# Patient Record
Sex: FEMALE | Race: WHITE | Employment: FULL TIME | URBAN - METROPOLITAN AREA
[De-identification: names, ages, dates, MRNs, and addresses within clinical notes are randomized per-mention and may not be internally consistent; named-entity substitution may affect disease eponyms.]

---

## 2017-01-03 ENCOUNTER — TELEPHONE (OUTPATIENT)
Dept: INTERNAL MEDICINE CLINIC | Age: 32
End: 2017-01-03

## 2017-01-03 DIAGNOSIS — R10.9 LT FLANK PAIN: Primary | ICD-10-CM

## 2017-01-03 NOTE — TELEPHONE ENCOUNTER
Pt called stating her pain is worse and she is now having pain around her belly button and her lower abdomen as well as her lower back. She called a urologist Rogelio Quiroga (she is visiting her grandmother there) and they scheduled her an appt for today at 12:00 she needs notes faxed to [phone] please generate referral    Valerie Pillai is out can you please do this Cecile.  Thanks

## 2017-01-26 ENCOUNTER — OFFICE VISIT (OUTPATIENT)
Dept: FAMILY MEDICINE CLINIC | Age: 32
End: 2017-01-26

## 2017-01-26 VITALS
SYSTOLIC BLOOD PRESSURE: 108 MMHG | WEIGHT: 115.2 LBS | HEART RATE: 86 BPM | DIASTOLIC BLOOD PRESSURE: 70 MMHG | OXYGEN SATURATION: 99 % | HEIGHT: 66 IN | RESPIRATION RATE: 18 BRPM | TEMPERATURE: 96.9 F | BODY MASS INDEX: 18.51 KG/M2

## 2017-01-26 DIAGNOSIS — I83.93 VARICOSE VEINS OF BOTH LOWER EXTREMITIES: ICD-10-CM

## 2017-01-26 DIAGNOSIS — B37.0 ORAL CANDIDIASIS: Primary | ICD-10-CM

## 2017-01-26 RX ORDER — NYSTATIN 100000 [USP'U]/ML
1 SUSPENSION ORAL 4 TIMES DAILY
Qty: 140 ML | Refills: 0 | Status: SHIPPED | OUTPATIENT
Start: 2017-01-26 | End: 2017-01-26 | Stop reason: SDUPTHER

## 2017-01-26 RX ORDER — NYSTATIN 100000 [USP'U]/ML
1 SUSPENSION ORAL 4 TIMES DAILY
Qty: 140 ML | Refills: 0 | Status: SHIPPED | OUTPATIENT
Start: 2017-01-26 | End: 2017-02-02

## 2017-01-26 NOTE — PATIENT INSTRUCTIONS
Candidiasis: Care Instructions  Your Care Instructions  Candidiasis (say \"jjb-qna-KU-uh-tiki\") is a yeast infection. Yeast normally lives in your body. But it can cause problems if your body's defenses don't work as they should. Some medicines can increase your chance of getting a yeast infection. These include antibiotics, steroids, and cancer drugs. And some diseases like AIDS and diabetes can make you more likely to get yeast infections. There are different types of yeast infections. Nilesh Pierceal is a yeast infection in the mouth. It usually occurs in people with weak immune systems. It causes white patches inside the mouth and throat. Yeast infections of the skin usually occur in skin folds where the skin stays moist. They cause red, oozing patches on your skin. Babies can get these infections under the diaper. People who often wear gloves can get them on their hands. Many women get vaginal yeast infections. They are most common when women take antibiotics. These infections can cause the vagina to itch and burn. They also cause white discharge that looks like cottage cheese. In rare cases, yeast infects the blood. This can cause serious disease. This kind of infection is treated with medicine given through a needle into a vein (IV). After you start treatment, a yeast infection usually goes away quickly. But if your immune system is weak, the infection may come back. Tell your doctor if you get yeast infections often. Follow-up care is a key part of your treatment and safety. Be sure to make and go to all appointments, and call your doctor if you are having problems. It's also a good idea to know your test results and keep a list of the medicines you take. How can you care for yourself at home? · Take your medicines exactly as prescribed. Call your doctor if you think you are having a problem with your medicine. · Use antibiotics only as directed by your doctor. · Eat yogurt with live cultures.  It has bacteria called lactobacillus. It may help prevent some types of yeast infections. · Keep your skin clean and dry. Put powder on moist places. · If you are using a cream or suppository to treat a vaginal yeast infection, don't use condoms or a diaphragm. Use a different type of birth control. · Eat a healthy diet and get regular exercise. This will help keep your immune system strong. When should you call for help? Call your doctor now or seek immediate medical care if:  · You have a fever. · You are pregnant and have signs of a vaginal or urinary tract infection such as:  ¨ Severe itching in your vagina. ¨ Pain during sex or when you urinate. ¨ Unusual discharge from your vagina. ¨ A frequent urge to urinate. ¨ Urine that is cloudy or smells bad. Watch closely for changes in your health, and be sure to contact your doctor if:  · You do not get better as expected. Where can you learn more? Go to http://janell-nick.info/. Enter F124 in the search box to learn more about \"Candidiasis: Care Instructions. \"  Current as of: February 5, 2016  Content Version: 11.1  © 2782-4122 [employer]. Care instructions adapted under license by [employer] (which disclaims liability or warranty for this information). If you have questions about a medical condition or this instruction, always ask your healthcare professional. [address] any warranty or liability for your use of this information.

## 2017-01-26 NOTE — PROGRESS NOTES
Denice Kaiser is a 36 y.o. female here today because she was on Doxy and since that, she has had discoloration on her tongue. It started off brown, then green, then yellow, and now white. No pain noted. Learning assessment previously completed. 1. Have you been to the ER, urgent care clinic or hospitalized since your last visit? ER [facility] about a month ago. 2. Have you seen or consulted any other health care providers outside of the [address] [place] [address] since your last visit (Include any pap smears or colon screening)? Urologist in [facility]     Do you have an Advanced Directive? no    Would you like information on Advanced Directives?  no

## 2017-01-26 NOTE — MR AVS SNAPSHOT
Visit Information Date & Time Provider Department Dept. Phone Encounter #  
 1/26/2017  3:45 PM Lisa Butcher, [address] [facility] [phone] [id number] Upcoming Health Maintenance Date Due  
 PAP AKA CERVICAL CYTOLOGY 1/19/2018 DTaP/Tdap/Td series (2 - Td) 7/1/2026 Allergies as of 1/26/2017  Review Complete On: 1/26/2017 By: SALAZAR Dawkins Severity Noted Reaction Type Reactions Bactrim [Sulfamethoprim Ds]  01/12/2015    Nausea and Vomiting Macrobid [Nitrofurantoin Monohyd/m-cryst]  01/12/2015    Itching Throat tingling Current Immunizations  Never Reviewed Name Date Tdap 7/1/2016 12:40 PM  
  
 Not reviewed this visit You Were Diagnosed With   
  
 Codes Comments Oral candidiasis    -  Primary ICD-10-CM: B37.0 ICD-9-CM: 112.0 Varicose veins of both lower extremities     ICD-10-CM: I83.93 
ICD-9-CM: 454.9 Vitals BP Pulse Temp Resp Height(growth percentile) Weight(growth percentile) 108/70 (BP 1 Location: Left arm, BP Patient Position: Sitting) 86 96.9 °F (36.1 °C) (Oral) 18 5' 6\" (1.676 m) 115 lb 3.2 oz (52.3 kg) LMP SpO2 BMI OB Status Smoking Status 01/23/2017 (Approximate) 99% 18.59 kg/m2 Having regular periods Never Smoker Vitals History BMI and BSA Data Body Mass Index Body Surface Area 18.59 kg/m 2 1.56 m 2 Preferred Pharmacy Pharmacy Name Phone [address], [address] [facility] [address] [place] [address] [phone] Your Updated Medication List  
  
   
This list is accurate as of: 1/26/17  4:31 PM.  Always use your most recent med list.  
  
  
  
  
 ibuprofen 600 mg tablet Commonly known as:  MOTRIN Take 1 Tab by mouth every six (6) hours as needed for Pain. nystatin 100,000 unit/mL suspension Commonly known as:  MYCOSTATIN Take 5 mL by mouth four (4) times daily for 7 days.  swish and spit Indications: ORAL CANDIDIASIS Prescriptions Printed Refills  
 nystatin (MYCOSTATIN) 100,000 unit/mL suspension 0 Sig: Take 5 mL by mouth four (4) times daily for 7 days. swish and spit  Indications: ORAL CANDIDIASIS Class: Print Route: Oral  
  
Patient Instructions Candidiasis: Care Instructions Your Care Instructions Candidiasis (say \"xme-ccn-TW-uh-tiki\") is a yeast infection. Yeast normally lives in your body. But it can cause problems if your body's defenses don't work as they should. Some medicines can increase your chance of getting a yeast infection. These include antibiotics, steroids, and cancer drugs. And some diseases like AIDS and diabetes can make you more likely to get yeast infections. There are different types of yeast infections. Cristina Roane is a yeast infection in the mouth. It usually occurs in people with weak immune systems. It causes white patches inside the mouth and throat. Yeast infections of the skin usually occur in skin folds where the skin stays moist. They cause red, oozing patches on your skin. Babies can get these infections under the diaper. People who often wear gloves can get them on their hands. Many women get vaginal yeast infections. They are most common when women take antibiotics. These infections can cause the vagina to itch and burn. They also cause white discharge that looks like cottage cheese. In rare cases, yeast infects the blood. This can cause serious disease. This kind of infection is treated with medicine given through a needle into a vein (IV). After you start treatment, a yeast infection usually goes away quickly. But if your immune system is weak, the infection may come back. Tell your doctor if you get yeast infections often. Follow-up care is a key part of your treatment and safety.  Be sure to make and go to all appointments, and call your doctor if you are having problems. It's also a good idea to know your test results and keep a list of the medicines you take. How can you care for yourself at home? · Take your medicines exactly as prescribed. Call your doctor if you think you are having a problem with your medicine. · Use antibiotics only as directed by your doctor. · Eat yogurt with live cultures. It has bacteria called lactobacillus. It may help prevent some types of yeast infections. · Keep your skin clean and dry. Put powder on moist places. · If you are using a cream or suppository to treat a vaginal yeast infection, don't use condoms or a diaphragm. Use a different type of birth control. · Eat a healthy diet and get regular exercise. This will help keep your immune system strong. When should you call for help? Call your doctor now or seek immediate medical care if: 
· You have a fever. · You are pregnant and have signs of a vaginal or urinary tract infection such as: ¨ Severe itching in your vagina. ¨ Pain during sex or when you urinate. ¨ Unusual discharge from your vagina. ¨ A frequent urge to urinate. ¨ Urine that is cloudy or smells bad. Watch closely for changes in your health, and be sure to contact your doctor if: 
· You do not get better as expected. Where can you learn more? Go to http://janell-nick.info/. Enter V529 in the search box to learn more about \"Candidiasis: Care Instructions. \" Current as of: February 5, 2016 Content Version: 11.1 © 2857-9606 [employer]. Care instructions adapted under license by [employer] (which disclaims liability or warranty for this information). If you have questions about a medical condition or this instruction, always ask your healthcare professional. [place] [address] any warranty or liability for your use of this information. Introducing [facility]! Dear Mandy Aguero: Thank you for requesting a [employer] account. Our records indicate that you already have an active [employer] account. You can access your account anytime at [email or web address]://[employer]. [employer] Did you know that you can access your hospital and ER discharge instructions at any time in [employer]? You can also review all of your test results from your hospital stay or ER visit. Additional Information If you have questions, please visit the Frequently Asked Questions section of the [employer] website at [email or web address]. [employer]/. Remember, [employer] is NOT to be used for urgent needs. For medical emergencies, dial 911. Now available from your iPhone and Android! Please provide this summary of care documentation to your next provider. Your primary care clinician is listed as NOT ON FILE. If you have any questions after today's visit, please call [phone].

## 2017-01-26 NOTE — PROGRESS NOTES
Progress Note  Today's Date:  2017   Patient:  Evita Garcia  Patient :  1985    Subjective:   Evita Garcia is a 36 y.o. female who presents with c/o with coat on her tongue. States that she was treated with Doxycycline over a week ago and since then she has had discoloration of her tongue from brown to green to yellow to now white. Has not used any medication but states that she has been brushing her tongue on a regular basis. Denies pain, swelling or bleeding. Also states that she has varicose veins to both lower extremities that have gotten more noticeable overtime. She is a . States that she stand up at work most of the time at work which also cause her legs to be achy and \"jumpy'' sometimes. States that massaging them helps. Denies any other symptoms. Current Outpatient Meds and Allergies     Current Outpatient Prescriptions on File Prior to Visit   Medication Sig Dispense Refill    ibuprofen (MOTRIN) 600 mg tablet Take 1 Tab by mouth every six (6) hours as needed for Pain. 20 Tab 0     No current facility-administered medications on file prior to visit. These medications have been reviewed and reconciled with the patient during today's visit. Allergies   Allergen Reactions    Bactrim [Sulfamethoprim Ds] Nausea and Vomiting    Macrobid [Nitrofurantoin Monohyd/M-Cryst] Itching     Throat tingling        ROS:     SEE HPI    Objective:     VS:    Visit Vitals    /70 (BP 1 Location: Left arm, BP Patient Position: Sitting)    Pulse 86    Temp 96.9 °F (36.1 °C) (Oral)    Resp 18    Ht 5' 6\" (1.676 m)    Wt 115 lb 3.2 oz (52.3 kg)    LMP 2017 (Approximate)    SpO2 99%    BMI 18.59 kg/m2       General:   well-nourished, well-groomed, pleasant, alert, in no acute distress.      Mouth: Large patch of white substance covering the tongue, nonpainful; good dentition, oropharynx without exudate, no petechiae or ulcers  Nose:  External nares WNL  Neck:  Neck supple with normal ROM for age, no thyromegaly,   Extremities:   Varicose veins, no edema, no tenderness with palpation of calves, warm and well-perfused  Neuro:   Alert, conversant, appropriate, following commands, no focal deficits. Assessment:       1. Oral candidiasis    2. Varicose veins of both lower extremities        Plan:       Orders Placed This Encounter    nystatin (MYCOSTATIN) 100,000 unit/mL suspension     Sig: Take 5 mL by mouth four (4) times daily for 7 days. swish and spit  Indications: ORAL CANDIDIASIS     Dispense:  140 mL     Refill:  0     Order for Nyastin given  She may benefit form Waist-high compression stoking  I have discussed the diagnosis with the patient and the intended plan as seen in the above orders. The patient has received an after-visit summary along with patient information handout. I have discussed medication side effects and warnings with the patient as well. Pt verbalized understanding. Follow-up Disposition:  Return if symptoms worsen or fail to improve.   SALAZAR Munguia  1/26/2017, 4:40 PM

## 2017-02-07 ENCOUNTER — OFFICE VISIT (OUTPATIENT)
Dept: FAMILY MEDICINE CLINIC | Age: 32
End: 2017-02-07

## 2017-02-07 VITALS
WEIGHT: 115.6 LBS | RESPIRATION RATE: 18 BRPM | OXYGEN SATURATION: 98 % | TEMPERATURE: 97.9 F | HEIGHT: 66 IN | BODY MASS INDEX: 18.58 KG/M2 | HEART RATE: 82 BPM | SYSTOLIC BLOOD PRESSURE: 118 MMHG | DIASTOLIC BLOOD PRESSURE: 87 MMHG

## 2017-02-07 DIAGNOSIS — B37.0 THRUSH: Primary | ICD-10-CM

## 2017-02-07 RX ORDER — NYSTATIN 100000 [USP'U]/ML
1 SUSPENSION ORAL 4 TIMES DAILY
Qty: 200 ML | Refills: 0 | Status: SHIPPED | OUTPATIENT
Start: 2017-02-07 | End: 2017-02-17

## 2017-02-07 RX ORDER — TERBINAFINE HYDROCHLORIDE 250 MG/1
250 TABLET ORAL DAILY
Qty: 14 TAB | Refills: 0 | Status: SHIPPED | OUTPATIENT
Start: 2017-02-07 | End: 2017-03-06 | Stop reason: ALTCHOICE

## 2017-02-07 NOTE — PATIENT INSTRUCTIONS
Please use the nystatin swish and spit, swish for as long as possible at least 1-2 minutes, 4 times a day for 10 days. If not improved then fill rx for the antifungal pill. Thrush in Children: Care Instructions  Your Care Instructions  Waynetta Kill is a yeast infection inside the mouth. It can look like milk, formula, or cottage cheese but is hard to remove. If you scrape the thrush away, the skin underneath may bleed. Your child might get thrush after using antibiotics. Often there is not a specific cause. It sometimes occurs at the same time as a diaper rash. Waynetta Kill in infants and young children isn't a serious problem. It usually goes away on its own. Some children may need antifungal medicine. Follow-up care is a key part of your child's treatment and safety. Be sure to make and go to all appointments, and call your doctor if your child is having problems. It's also a good idea to know your child's test results and keep a list of the medicines your child takes. How can you care for your child at home? · Clean bottle nipples and pacifiers regularly in boiling water. · If you are breastfeeding, use an antifungal medicine, such as nystatin (Mycostatin), on your nipples. Dry your nipples after breastfeeding. · If your child is eating solid foods, you can massage plain, unflavored yogurt around the inside of your child's mouth. Check the label to make sure that the yogurt contains live cultures. Yogurt may help healthy bacteria grow in the mouth. These bacteria can stop yeast growth. · Be safe with medicines. Have your child take medicines exactly as prescribed. Call your doctor if you think your child is having a problem with his or her medicine. When should you call for help? Watch closely for changes in your child's health, and be sure to contact your doctor if:  · Your child will not eat or drink. · You have trouble giving or applying the medicine to your child.   · Your child still has thrush after 7 days.  · Your child gets a new diaper rash. · Your child is not acting normally. · Your child has a fever. Where can you learn more? Go to http://janell-nick.info/. Enter V150 in the search box to learn more about \"Thrush in Children: Care Instructions. \"  Current as of: July 26, 2016  Content Version: 11.1  © 2681-8566 [employer]. Care instructions adapted under license by [employer] (which disclaims liability or warranty for this information). If you have questions about a medical condition or this instruction, always ask your healthcare professional. [place] [address] any warranty or liability for your use of this information.

## 2017-02-07 NOTE — PROGRESS NOTES
Thrush        HPI: Micky Hogue is a 36 y.o. female WHITE OR   Here in follow up of continued thrush on her tongue she was seen on 1/26/17 by SALAZAR Kothari. Started on Nystatin swish and spit 5mL QID x 7 days. She believes it has improved somewhat but has not resolved completely. Denies any fevers. Past Medical History   Diagnosis Date    IC (interstitial cystitis)        Current Outpatient Prescriptions   Medication Sig    nystatin (MYCOSTATIN) 100,000 unit/mL suspension Take 5 mL by mouth four (4) times daily for 10 days. swish and spit    terbinafine HCl (LAMISIL) 250 mg tablet Take 1 Tab by mouth daily.  ibuprofen (MOTRIN) 600 mg tablet Take 1 Tab by mouth every six (6) hours as needed for Pain. No current facility-administered medications for this visit. Allergies   Allergen Reactions    Bactrim [Sulfamethoprim Ds] Nausea and Vomiting    Macrobid [Nitrofurantoin Monohyd/M-Cryst] Itching     Throat tingling        Past Surgical History   Procedure Laterality Date    Hx bladder suspension         Family History   Problem Relation Age of Onset    Cancer Mother      cervical/ pre-cancerous    No Known Problems Father     Diabetes Maternal Grandmother     Alcohol abuse Maternal Grandfather     Dementia Maternal Grandfather     Heart Disease Paternal Grandmother     Heart Disease Paternal Grandfather        Social History     Social History    Marital status: SINGLE     Spouse name: N/A    Number of children: N/A    Years of education: N/A     Occupational History    Not on file.      Social History Main Topics    Smoking status: Never Smoker    Smokeless tobacco: Never Used    Alcohol use No    Drug use: No    Sexual activity: Yes     Partners: Male     Birth control/ protection: Condom     Other Topics Concern     Service No    Blood Transfusions No    Caffeine Concern No    Occupational Exposure No    Hobby Hazards No    Sleep Concern No    Stress Concern No    Weight Concern No    Special Diet No    Back Care No    Exercise No    Bike Helmet No    Seat Belt Yes    Self-Exams Yes     Social History Narrative       Review of Systems   Constitutional: Negative for chills and fever. Visit Vitals    /87 (BP 1 Location: Right arm, BP Patient Position: Sitting)    Pulse 82    Temp 97.9 °F (36.6 °C) (Oral)    Resp 18    Ht 5' 6\" (1.676 m)    Wt 115 lb 9.6 oz (52.4 kg)    LMP 01/23/2017 (Approximate)    SpO2 98%    BMI 18.66 kg/m2       Physical Exam   Constitutional: She is oriented to person, place, and time. She appears well-developed and well-nourished. No distress. HENT:   Head: Normocephalic and atraumatic. Right Ear: External ear normal.   Left Ear: External ear normal.   Neurological: She is alert and oriented to person, place, and time. Skin: Skin is warm and dry. She is not diaphoretic. Psychiatric: She has a normal mood and affect. Nursing note and vitals reviewed. Assesment:  1. Thrush  Use nystatin swish and spit x 10 days. If no improvement then use terbinafine PO x 2 weeks. - nystatin (MYCOSTATIN) 100,000 unit/mL suspension; Take 5 mL by mouth four (4) times daily for 10 days. swish and spit  Dispense: 200 mL; Refill: 0  - terbinafine HCl (LAMISIL) 250 mg tablet; Take 1 Tab by mouth daily. Dispense: 14 Tab; Refill: 0      I have discussed the diagnosis with the patient and the intended management  The patient has received an after-visit summary and questions were answered concerning future plans. I have discussed medication usage, side effects and warnings with the patient as well. I have reviewed the plan of care with the patient, accepted their input and they are in agreement with the treatment goals. Follow-up Disposition:  Return if symptoms worsen or fail to improve.       Jonetta Schilder, MD                .

## 2017-02-21 ENCOUNTER — OFFICE VISIT (OUTPATIENT)
Dept: INTERNAL MEDICINE CLINIC | Age: 32
End: 2017-02-21

## 2017-02-21 VITALS
OXYGEN SATURATION: 99 % | DIASTOLIC BLOOD PRESSURE: 65 MMHG | HEART RATE: 85 BPM | BODY MASS INDEX: 18.48 KG/M2 | WEIGHT: 115 LBS | TEMPERATURE: 97.8 F | RESPIRATION RATE: 18 BRPM | SYSTOLIC BLOOD PRESSURE: 101 MMHG | HEIGHT: 66 IN

## 2017-02-21 DIAGNOSIS — N64.4 BREAST PAIN, RIGHT: ICD-10-CM

## 2017-02-21 DIAGNOSIS — N63.10 BREAST MASS, RIGHT: Primary | ICD-10-CM

## 2017-02-21 NOTE — PROGRESS NOTES
Micky Hogue presents today at the clinic for. Chief Complaint   Patient presents with    Breast Mass     Right Side Pain to touch         Wt Readings from Last 3 Encounters:   02/21/17 115 lb (52.2 kg)   02/07/17 115 lb 9.6 oz (52.4 kg)   01/26/17 115 lb 3.2 oz (52.3 kg)     Temp Readings from Last 3 Encounters:   02/21/17 97.8 °F (36.6 °C) (Oral)   02/07/17 97.9 °F (36.6 °C) (Oral)   01/26/17 96.9 °F (36.1 °C) (Oral)     BP Readings from Last 3 Encounters:   02/21/17 101/65   02/07/17 118/87   01/26/17 108/70     Pulse Readings from Last 3 Encounters:   02/21/17 85   02/07/17 82   01/26/17 86       There are no preventive care reminders to display for this patient. Coordination of Care:    1. Have you been to the ER, urgent care clinic since your last visit? Hospitalized since your last visit? No    2. Have you seen or consulted any other health care providers outside of the [address] [place] [address] since your last visit? Include any pap smears or colon screening.  No

## 2017-02-21 NOTE — PATIENT INSTRUCTIONS
Breast Lumps: Care Instructions  Your Care Instructions  Breast lumps are common, especially in women between ages 27 and 48. Many womens breasts feel lumpy and tender before their menstrual period. Women also may have lumps when they are breastfeeding. Breast lumps may go away after menopause. All new breast lumps in women after menopause should be checked by a doctor. Although lumps may be normal for you, it is important to have your doctor check any lump or thickness that is not like the rest of your breast to make sure it is not cancer. A lump may be larger, harder, or different from the rest of your breast tissue. Follow-up care is a key part of your treatment and safety. Be sure to make and go to all appointments, and call your doctor if you are having problems. Its also a good idea to know your test results and keep a list of the medicines you take. How can you care for yourself at home? · Make an appointment to have a mammogram and other follow-up visits as recommended by your doctor. When should you call for help? Call your doctor now or seek immediate medical care if:  · You have new changes in a breast, such as:  ¨ A lump or thickening in your breast or armpit. ¨ A change in the breast's size or shape. ¨ Skin changes, such as dimples or puckers. ¨ Nipple discharge. ¨ A change in the color or feel of the skin of your breast or the darker area around the nipple (areola). ¨ A change in the shape of the nipple (it may look like it's being pulled into the breast). · You have symptoms of a breast infection, such as:  ¨ Increased pain, swelling, redness, or warmth around a breast.  ¨ Red streaks extending from the breast.  ¨ Pus draining from a breast.  ¨ A fever. Watch closely for changes in your health, and be sure to contact your doctor if:  · You do not get better as expected. Where can you learn more? Go to http://janell-nick.info/.   Enter W663 in the search box to learn more about \"Breast Lumps: Care Instructions. \"  Current as of: February 25, 2016  Content Version: 11.1  © 8210-0635 [employer]. Care instructions adapted under license by [employer] (which disclaims liability or warranty for this information). If you have questions about a medical condition or this instruction, always ask your healthcare professional. [address] any warranty or liability for your use of this information.

## 2017-02-21 NOTE — PROGRESS NOTES
HISTORY OF PRESENT ILLNESS  Zara Carranza is a 36 y.o. female. HPI   Pt is here for a tender mass on her right breast/chest that she just noticed a few weeks ago. Denies nipple discharge, redness, skin changes, weight changes, and famhx of breast cancer. Allergies   Allergen Reactions    Bactrim [Sulfamethoprim Ds] Nausea and Vomiting    Macrobid [Nitrofurantoin Monohyd/M-Cryst] Itching     Throat tingling        Current Outpatient Prescriptions   Medication Sig    terbinafine HCl (LAMISIL) 250 mg tablet Take 1 Tab by mouth daily.  ibuprofen (MOTRIN) 600 mg tablet Take 1 Tab by mouth every six (6) hours as needed for Pain. No current facility-administered medications for this visit. Review of Systems   Constitutional: Negative. Negative for chills, fever and malaise/fatigue. HENT: Negative. Negative for congestion, ear pain, sore throat and tinnitus. Eyes: Negative. Negative for blurred vision, double vision and photophobia. Respiratory: Negative. Negative for cough, shortness of breath and wheezing. Cardiovascular: Positive for chest pain (right breast mass/tender). Negative for palpitations and leg swelling. Gastrointestinal: Negative. Negative for abdominal pain, heartburn, nausea and vomiting. Genitourinary: Negative. Negative for dysuria, frequency, hematuria and urgency. Musculoskeletal: Negative. Negative for back pain, joint pain, myalgias and neck pain. Skin: Negative. Negative for itching and rash. Neurological: Negative. Negative for dizziness, tingling, tremors and headaches. Psychiatric/Behavioral: Negative. Negative for depression and memory loss. The patient is not nervous/anxious and does not have insomnia.       Visit Vitals    /65 (BP 1 Location: Left arm, BP Patient Position: Sitting)    Pulse 85    Temp 97.8 °F (36.6 °C) (Oral)    Resp 18    Ht 5' 6\" (1.676 m)    Wt 115 lb (52.2 kg)    SpO2 99%    BMI 18.56 kg/m2       Physical Exam   Constitutional: She is oriented to person, place, and time. She appears well-developed and well-nourished. No distress. Pulmonary/Chest: Right breast exhibits mass and tenderness. Right breast exhibits no inverted nipple, no nipple discharge and no skin change. Left breast exhibits no inverted nipple, no mass, no nipple discharge, no skin change and no tenderness. Breasts are asymmetrical.       Genitourinary: There is breast tenderness (right breast). Neurological: She is alert and oriented to person, place, and time. Skin: Skin is warm and dry. She is not diaphoretic. Psychiatric: She has a normal mood and affect. Her behavior is normal.       ASSESSMENT and PLAN    ICD-10-CM ICD-9-CM    1. Breast mass, right N63 611.72 US BREAST RT LIMITED=<3 QUAD   2. Breast pain, right N64.4 611.71 US BREAST RT LIMITED=<3 QUAD     Follow-up Disposition:  Return if symptoms worsen or fail to improve. Pt expressed understanding of visit summary and plans for any follow ups or referrals as well as any medications prescribed.

## 2017-02-23 ENCOUNTER — TELEPHONE (OUTPATIENT)
Dept: INTERNAL MEDICINE CLINIC | Age: 32
End: 2017-02-23

## 2017-02-23 NOTE — TELEPHONE ENCOUNTER
JORGE Landaverde I spoke to the patient and she said she had her Breast Ultrasound today and they told her \"everything looks ok. \" She was very relieved and I told her you should have the results for review tomorrow.

## 2017-03-06 ENCOUNTER — HOSPITAL ENCOUNTER (OUTPATIENT)
Dept: LAB | Age: 32
Discharge: HOME | End: 2017-03-06
Payer: COMMERCIAL

## 2017-03-06 ENCOUNTER — OFFICE VISIT (OUTPATIENT)
Dept: FAMILY MEDICINE CLINIC | Age: 32
End: 2017-03-06

## 2017-03-06 VITALS
SYSTOLIC BLOOD PRESSURE: 114 MMHG | RESPIRATION RATE: 18 BRPM | HEART RATE: 90 BPM | OXYGEN SATURATION: 100 % | WEIGHT: 119.6 LBS | BODY MASS INDEX: 19.22 KG/M2 | TEMPERATURE: 96.9 F | DIASTOLIC BLOOD PRESSURE: 73 MMHG | HEIGHT: 66 IN

## 2017-03-06 DIAGNOSIS — B37.0 THRUSH: Primary | ICD-10-CM

## 2017-03-06 DIAGNOSIS — B37.0 THRUSH: ICD-10-CM

## 2017-03-06 PROCEDURE — 87070 CULTURE OTHR SPECIMN AEROBIC: CPT | Performed by: FAMILY MEDICINE

## 2017-03-06 RX ORDER — FLUCONAZOLE 200 MG/1
200 TABLET ORAL DAILY
Qty: 14 TAB | Refills: 0 | Status: SHIPPED | OUTPATIENT
Start: 2017-03-06 | End: 2017-03-20

## 2017-03-06 NOTE — PATIENT INSTRUCTIONS
Throat Culture: About This Test  What is it? A throat culture is a test to find a bacterial or fungal infection in the throat. Why is this test done? A throat culture may be done to:  · Find the cause of a sore throat. Most sore throat infections are caused by a virus. A throat culture shows the difference between a bacterial infection and a viral infection. · Check a person who may not have any symptoms of infection but who carries bacteria that can spread to others. This person is called a carrier. How can you prepare for the test?  You don't need to do anything before you have this test. Tell your doctor if you have recently taken any antibiotics. What happens during the test?  · You will be asked to tilt your head back and open your mouth as wide as possible. · Your doctor will press your tongue down with a flat stick (tongue depressor) and then examine your mouth and throat. · A clean cotton swab will be rubbed over the back of your throat, around your tonsils, and over any red areas or sores to collect a sample. How long does the test take? · The test will take less than a minute. · Throat culture test results for bacterial infections are ready in 1 to 2 days, depending on which bacteria are being tested for. Test results for a fungus may take about 7 days. When should you call for help? Call your doctor now or seek immediate medical care if:  · You have a new or higher fever  · You have a fever with a stiff neck or severe headache. · You have new or worse trouble swallowing. · Your sore throat gets much worse on one side. Watch closely for changes in your health, and be sure to contact your doctor if:  · You do not start to feel better after 2 days (48 hours). · You do not get better as expected. Follow-up care is a key part of your treatment and safety. Be sure to make and go to all appointments, and call your doctor if you are having problems.  It's also a good idea to keep a list of the medicines you take. Ask your doctor when you can expect to have your test results. Where can you learn more? Go to http://janell-nick.info/. Enter V393 in the search box to learn more about \"Throat Culture: About This Test.\"  Current as of: February 19, 2016  Content Version: 11.1  © 2006-2016 [employer]. Care instructions adapted under license by [employer] (which disclaims liability or warranty for this information). If you have questions about a medical condition or this instruction, always ask your healthcare professional. [address] any warranty or liability for your use of this information.

## 2017-03-06 NOTE — PROGRESS NOTES
John Dumas is a 36 y.o. female here today for c/o thrush. She states it has not gone away and she is out of medication. She has done away with sugar all week and using garlic for 3 days. She has also been taking pro-biotics. Learning assessment previously completed. 1. Have you been to the ER, urgent care clinic or hospitalized since your last visit? no     2. Have you seen or consulted any other health care providers outside of the [address] [place] [address] since your last visit (Include any pap smears or colon screening)? Went to gastro Dr. Jerry Staton     Do you have an Advanced Directive? no    Would you like information on Advanced Directives?  yes

## 2017-03-07 PROBLEM — Z71.89 ACP (ADVANCE CARE PLANNING): Status: ACTIVE | Noted: 2017-03-06

## 2017-03-07 NOTE — PROGRESS NOTES
Thrush        HPI: Caitlyn Colbert is a 36 y.o. female WHITE [place]   Here in follow up of her thrush of the tongue. She has completed her course of terbinafine but it has not improved. She denies any fevers. Past Medical History:   Diagnosis Date    IC (interstitial cystitis)        Current Outpatient Prescriptions   Medication Sig    fluconazole (DIFLUCAN) 200 mg tablet Take 1 Tab by mouth daily for 14 days. FDA advises cautious prescribing of oral fluconazole in pregnancy.  ibuprofen (MOTRIN) 600 mg tablet Take 1 Tab by mouth every six (6) hours as needed for Pain. No current facility-administered medications for this visit. Allergies   Allergen Reactions    Bactrim [Sulfamethoprim Ds] Nausea and Vomiting    Macrobid [Nitrofurantoin Monohyd/M-Cryst] Itching     Throat tingling        Past Surgical History:   Procedure Laterality Date    HX BLADDER SUSPENSION         Family History   Problem Relation Age of Onset    Cancer Mother      cervical/ pre-cancerous    No Known Problems Father     Diabetes Maternal Grandmother     Alcohol abuse Maternal Grandfather     Dementia Maternal Grandfather     Heart Disease Paternal Grandmother     Heart Disease Paternal Grandfather        Social History     Social History    Marital status: SINGLE     Spouse name: N/A    Number of children: N/A    Years of education: N/A     Occupational History    Not on file.      Social History Main Topics    Smoking status: Never Smoker    Smokeless tobacco: Never Used    Alcohol use No    Drug use: No    Sexual activity: Yes     Partners: Male     Birth control/ protection: Condom     Other Topics Concern     Service No    Blood Transfusions No    Caffeine Concern No    Occupational Exposure No    Hobby Hazards No    Sleep Concern No    Stress Concern No    Weight Concern No    Special Diet No    Back Care No    Exercise No    Bike Helmet No    Seat Belt Yes    Self-Exams Yes Social History Narrative     Review of Systems   Constitutional: Negative for chills, fever and weight loss. Visit Vitals    /73 (BP 1 Location: Left arm, BP Patient Position: Sitting)    Pulse 90    Temp 96.9 °F (36.1 °C) (Oral)    Resp 18    Ht 5' 6\" (1.676 m)    Wt 119 lb 9.6 oz (54.3 kg)    LMP 02/17/2017    SpO2 100%    BMI 19.3 kg/m2       Physical Exam   Constitutional: She is oriented to person, place, and time. She appears well-developed and well-nourished. No distress. HENT:   Head: Normocephalic and atraumatic. Right Ear: External ear normal.   Left Ear: External ear normal.   White spots on posterior aspect of tongue   Lymphadenopathy:     She has no cervical adenopathy. Neurological: She is alert and oriented to person, place, and time. Skin: She is not diaphoretic. Nursing note and vitals reviewed. Assesment:  1. Thrush    - THROAT CULTURE; Future  - fluconazole (DIFLUCAN) 200 mg tablet; Take 1 Tab by mouth daily for 14 days. FDA advises cautious prescribing of oral fluconazole in pregnancy. Dispense: 14 Tab; Refill: 0      I have discussed the diagnosis with the patient and the intended management  The patient has received an after-visit summary and questions were answered concerning future plans. I have discussed medication usage, side effects and warnings with the patient as well. I have reviewed the plan of care with the patient, accepted their input and they are in agreement with the treatment goals. Follow-up Disposition:  Return if symptoms worsen or fail to improve.       La Junior MD                .

## 2017-03-08 LAB
BACTERIA SPEC CULT: NORMAL
BACTERIA SPEC CULT: NORMAL
SERVICE CMNT-IMP: NORMAL

## 2017-04-14 ENCOUNTER — OFFICE VISIT (OUTPATIENT)
Dept: FAMILY MEDICINE CLINIC | Age: 32
End: 2017-04-14

## 2017-04-14 VITALS
TEMPERATURE: 97.1 F | RESPIRATION RATE: 18 BRPM | SYSTOLIC BLOOD PRESSURE: 133 MMHG | BODY MASS INDEX: 19.29 KG/M2 | DIASTOLIC BLOOD PRESSURE: 82 MMHG | HEART RATE: 94 BPM | WEIGHT: 120 LBS | HEIGHT: 66 IN | OXYGEN SATURATION: 100 %

## 2017-04-14 DIAGNOSIS — K14.8 LESION OF TONGUE: Primary | ICD-10-CM

## 2017-04-14 NOTE — PATIENT INSTRUCTIONS
Learning About Dental Care  What is basic dental care? Basic dental care involves brushing and flossing your teeth regularly to remove plaque. Plaque is a thin film of bacteria that sticks to teeth above and below the gum line. It can build up and harden into tartar, which makes it harder to give the teeth a good cleaning. Tartar usually has to be removed by a dental hygienist.  The bacteria in plaque use sugars to make acids. These acids can damage the gums and teeth. Be sure to see your dentist and dental hygienist for regular checkups and cleanings. How can dental care affect your health? Practicing basic dental care:  · Removes plaque that can cause gum disease and tooth decay. Tooth decay can lead to a hole in the tooth (cavity). · Helps prevent bad breath. Brushing and flossing rid your mouth of the bacteria that cause bad breath. · Helps keep teeth white by preventing staining from food, drinks, and tobacco.  · Makes it possible for your teeth to last a lifetime. What can you do to prevent dental problems? · Brush your teeth twice a day, in the morning and at night. ¨ Use a toothbrush with soft, rounded-end bristles and a head that is small enough to reach all parts of your teeth and mouth. ¨ Replace your toothbrush every 3 to 4 months. ¨ Use a fluoride toothpaste. ¨ Place the brush at a 45-degree angle where the teeth meet the gums. Press firmly, and gently rock the brush back and forth using small circular movements. ¨ Brush chewing surfaces vigorously with short back-and-forth strokes. ¨ Brush your tongue from back to front. · Floss at least once a day. Choose the type and flavor you like best.  · Schedule checkups and cleanings as often as your dentist recommends it. · Eat a healthy diet to help keep your gums healthy and your teeth strong. Choose foods that are good for your teeth, such as whole grains, vegetables, fruits, and foods that are low in saturated fat and sodium. Mozzarella and other cheeses, peanuts, yogurt, and milk are good for your teeth. Sugar-free chewing gum (especially gum that contains xylitol) is also a good choice. · Avoid foods that contain a lot of sugar, especially sticky, sweet foods like taffy. · Don't snack before bedtime. Food left on the teeth is more likely to cause tooth decay overnight. · Don't smoke or use smokeless tobacco. Tobacco can make tooth decay worse. If you need help quitting, talk to your doctor about stop-smoking programs and medicines. These can increase your chances of quitting for good. Where can you learn more? Go to http://janell-nick.info/. Enter J358 in the search box to learn more about \"Learning About Dental Care. \"  Current as of: August 9, 2016  Content Version: 11.2  © 8781-3699 [employer]. Care instructions adapted under license by [employer] (which disclaims liability or warranty for this information). If you have questions about a medical condition or this instruction, always ask your healthcare professional. [address] any warranty or liability for your use of this information.

## 2017-04-14 NOTE — PROGRESS NOTES
Ace Conde is a 36 y.o. female here today for a follow up on her thrush. Learning assessment previously completed. 1. Have you been to the ER, urgent care clinic or hospitalized since your last visit? no    2. Have you seen or consulted any other health care providers outside of the [address] [place] [address] since your last visit (Include any pap smears or colon screening)? Urologist  At [facility]    Do you have an Advanced Directive? No    Would you like information on Advanced Directives?  no

## 2017-04-14 NOTE — PROGRESS NOTES
HISTORY OF PRESENT ILLNESS  Renaldo Chamorro is a 36 y.o. female. HPI  Renaldo Chamorro is a 36 y.o. female who presents to the office today for thrush. She is new to me. She has been evaluated and treated for thrush by both Lisa and Dr. Rae Pugh. This started soon after she was given an oral antibiotic for GYN issues in January. First she was given two courses of nystatin wash. Then treated with a course of terbinafine and a 14 day course of diflucan with no improvement. Tongue culture was negative for organisms other than normal lenard. She is here today because she does not think there has been any change in the white patches on her tongue. Denies tobacco or alcohol use. She has regular dental cleanings every 6 months. She saw her dentist a few months ago during her treatment for possible thrush, and they told her there were no concerns, and her exam looked normal.       Chief Complaint   Patient presents with   Sabra Acuna       Current Outpatient Prescriptions on File Prior to Visit   Medication Sig Dispense Refill    ibuprofen (MOTRIN) 600 mg tablet Take 1 Tab by mouth every six (6) hours as needed for Pain. 20 Tab 0     No current facility-administered medications on file prior to visit.       Allergies   Allergen Reactions    Bactrim [Sulfamethoprim Ds] Nausea and Vomiting    Macrobid [Nitrofurantoin Monohyd/M-Cryst] Itching     Throat tingling      Past Medical History:   Diagnosis Date    IC (interstitial cystitis)      History   Smoking Status    Never Smoker   Smokeless Tobacco    Never Used     History   Alcohol Use No     Family History   Problem Relation Age of Onset    Cancer Mother      cervical/ pre-cancerous    No Known Problems Father     Diabetes Maternal Grandmother     Alcohol abuse Maternal Grandfather     Dementia Maternal Grandfather     Heart Disease Paternal Grandmother     Heart Disease Paternal Grandfather      Review of Systems   Constitutional: Negative for fever and malaise/fatigue. HENT: Negative for congestion and sore throat. Respiratory: Negative for cough and sputum production. Skin: Negative for itching. Endo/Heme/Allergies: Negative for environmental allergies. Does not bruise/bleed easily. Visit Vitals    /82 (BP 1 Location: Left arm, BP Patient Position: Sitting)    Pulse 94    Temp 97.1 °F (36.2 °C) (Oral)    Resp 18    Ht 5' 6\" (1.676 m)    Wt 120 lb (54.4 kg)    LMP 04/11/2017 (Exact Date)    SpO2 100%    BMI 19.37 kg/m2     Physical Exam   Constitutional: She is oriented to person, place, and time. She appears well-developed and well-nourished. No distress. HENT:   Mouth/Throat: Uvula is midline, oropharynx is clear and moist and mucous membranes are normal. No oral lesions. Normal dentition. No lacerations. Neck: Neck supple. Cardiovascular: Normal rate. Lymphadenopathy:        Head (right side): No submental, no submandibular, no tonsillar, no preauricular and no posterior auricular adenopathy present. Head (left side): No submental, no submandibular, no tonsillar, no preauricular and no posterior auricular adenopathy present. She has no cervical adenopathy. Right: No supraclavicular adenopathy present. Left: No supraclavicular adenopathy present. Neurological: She is alert and oriented to person, place, and time. Psychiatric: She has a normal mood and affect. Nursing note and vitals reviewed. ASSESSMENT and PLAN    ICD-10-CM ICD-9-CM    1. Lesion of tongue K14.8 529.8       Does not appear to have thrush on exam. I do not see any tongue lesions at all and no white patches or film. We discussed that this will be monitored, and if she notices a change, then return to the clinic and discuss a tongue biopsy. She has regular dental cleanings scheduled every 6 months and there was no concern at her last cleaning a few months ago. Patient agrees with the plan and verbalizes understanding. Follow-up Disposition:  Return if symptoms worsen or fail to improve.     Raina Leslie PA-C  4/14/2017

## 2017-05-01 ENCOUNTER — OFFICE VISIT (OUTPATIENT)
Dept: INTERNAL MEDICINE CLINIC | Age: 32
End: 2017-05-01

## 2017-05-01 VITALS
HEART RATE: 100 BPM | DIASTOLIC BLOOD PRESSURE: 69 MMHG | WEIGHT: 119 LBS | SYSTOLIC BLOOD PRESSURE: 122 MMHG | RESPIRATION RATE: 18 BRPM | HEIGHT: 66 IN | TEMPERATURE: 98.5 F | BODY MASS INDEX: 19.13 KG/M2 | OXYGEN SATURATION: 100 %

## 2017-05-01 DIAGNOSIS — R10.84 GENERALIZED ABDOMINAL PAIN: Primary | ICD-10-CM

## 2017-05-01 LAB
BILIRUB UR QL STRIP: NEGATIVE
GLUCOSE UR-MCNC: NEGATIVE MG/DL
HCG URINE, QL. (POC): NEGATIVE
KETONES P FAST UR STRIP-MCNC: NEGATIVE MG/DL
PH UR STRIP: 6 [PH] (ref 4.6–8)
PROT UR QL STRIP: NEGATIVE MG/DL
SP GR UR STRIP: 1.03 (ref 1–1.03)
UA UROBILINOGEN AMB POC: NORMAL (ref 0.2–1)
URINALYSIS CLARITY POC: CLEAR
URINALYSIS COLOR POC: YELLOW
URINE BLOOD POC: NEGATIVE
URINE LEUKOCYTES POC: NEGATIVE
URINE NITRITES POC: NEGATIVE
VALID INTERNAL CONTROL?: YES

## 2017-05-01 NOTE — PROGRESS NOTES
HISTORY OF PRESENT ILLNESS  Raven Sheikh is a 29 y.o. female. HPI Ms. Edwin Banks is here for c/o sensation that her stomach is  \"burning\". She did see gastroenterology - Dr. Ken Jesus within the past few months. She had an endoscopy with no abnormal findings. She states he wanted her to do a breath test (lactulose breath test), but she had not done that. She did tell me at the end of the visit that she has the breath test scheduled for tomorrow. She was given an antispasmodic and advised to take metamucil. She has not taken the antispasmodic (Levsin). GI notes are scanned. Review of Systems   Constitutional: Negative. HENT: Negative. Respiratory: Negative. Cardiovascular: Negative. Gastrointestinal: Positive for abdominal pain (generalized burning abdominal pain) and nausea. Neurological: Positive for dizziness. Physical Exam   Constitutional: She is oriented to person, place, and time. She appears well-developed and well-nourished. No distress. HENT:   Head: Normocephalic and atraumatic. Cardiovascular: Normal rate. Pulmonary/Chest: Effort normal.   Abdominal: Soft. She exhibits no distension and no mass. There is no tenderness. Neurological: She is alert and oriented to person, place, and time.      Visit Vitals    /69 (BP 1 Location: Left arm, BP Patient Position: Sitting)    Pulse 100    Temp 98.5 °F (36.9 °C)    Resp 18    Ht 5' 6\" (1.676 m)    Wt 119 lb (54 kg)    SpO2 100%    BMI 19.21 kg/m2     Results for orders placed or performed in visit on 05/01/17   AMB POC URINALYSIS DIP STICK AUTO W/O MICRO   Result Value Ref Range    Color (UA POC) Yellow     Clarity (UA POC) Clear     Glucose (UA POC) Negative Negative    Bilirubin (UA POC) Negative Negative    Ketones (UA POC) Negative Negative    Specific gravity (UA POC) 1.030 1.001 - 1.035    Blood (UA POC) Negative Negative    pH (UA POC) 6.0 4.6 - 8.0    Protein (UA POC) Negative Negative mg/dL    Urobilinogen (UA POC) 0.2 mg/dL 0.2 - 1    Nitrites (UA POC) Negative Negative    Leukocyte esterase (UA POC) Negative Negative   AMB POC URINE PREGNANCY TEST, VISUAL COLOR COMPARISON   Result Value Ref Range    VALID INTERNAL CONTROL POC Yes     HCG urine, Ql. (POC) Negative Negative       ASSESSMENT and PLAN    ICD-10-CM ICD-9-CM    1. Generalized abdominal pain R10.84 789.07 AMB POC URINALYSIS DIP STICK AUTO W/O MICRO      AMB POC URINE PREGNANCY TEST, VISUAL COLOR COMPARISON      CBC WITH AUTOMATED DIFF      METABOLIC PANEL, COMPREHENSIVE      H. PYLORI BREATH TEST     Pt verbalized understanding of their condition and diagnoses, treatment plan,  as well as side effects of any new medications prescribed. Total time spent with pt 25min, greater than 50% which was spent counseling.

## 2017-05-01 NOTE — PROGRESS NOTES
Chief Complaint   Patient presents with    Abdominal Pain    Nausea     1. Have you been to the ER, urgent care clinic since your last visit? Hospitalized since your last visit? No    2. Have you seen or consulted any other health care providers outside of the [address] [place] [address] since your last visit? Include any pap smears or colon screening.  No

## 2017-05-02 LAB
ALBUMIN SERPL-MCNC: 4.2 G/DL (ref 3.5–5.5)
ALBUMIN/GLOB SERPL: 1.6 {RATIO} (ref 1.2–2.2)
ALP SERPL-CCNC: 46 IU/L (ref 39–117)
ALT SERPL-CCNC: 12 IU/L (ref 0–32)
AST SERPL-CCNC: 14 IU/L (ref 0–40)
BASOPHILS # BLD AUTO: 0 X10E3/UL (ref 0–0.2)
BASOPHILS NFR BLD AUTO: 0 %
BILIRUB SERPL-MCNC: 0.3 MG/DL (ref 0–1.2)
BUN SERPL-MCNC: 17 MG/DL (ref 6–20)
BUN/CREAT SERPL: 23 (ref 9–23)
CALCIUM SERPL-MCNC: 9 MG/DL (ref 8.7–10.2)
CHLORIDE SERPL-SCNC: 102 MMOL/L (ref 96–106)
CO2 SERPL-SCNC: 22 MMOL/L (ref 18–29)
CREAT SERPL-MCNC: 0.73 MG/DL (ref 0.57–1)
EOSINOPHIL # BLD AUTO: 0.1 X10E3/UL (ref 0–0.4)
EOSINOPHIL NFR BLD AUTO: 1 %
ERYTHROCYTE [DISTWIDTH] IN BLOOD BY AUTOMATED COUNT: 13.7 % (ref 12.3–15.4)
GLOBULIN SER CALC-MCNC: 2.7 G/DL (ref 1.5–4.5)
GLUCOSE SERPL-MCNC: 74 MG/DL (ref 65–99)
HCT VFR BLD AUTO: 40.1 % (ref 34–46.6)
HGB BLD-MCNC: 13 G/DL (ref 11.1–15.9)
IMM GRANULOCYTES # BLD: 0 X10E3/UL (ref 0–0.1)
IMM GRANULOCYTES NFR BLD: 0 %
LYMPHOCYTES # BLD AUTO: 2.3 X10E3/UL (ref 0.7–3.1)
LYMPHOCYTES NFR BLD AUTO: 34 %
MCH RBC QN AUTO: 28.9 PG (ref 26.6–33)
MCHC RBC AUTO-ENTMCNC: 32.4 G/DL (ref 31.5–35.7)
MCV RBC AUTO: 89 FL (ref 79–97)
MONOCYTES # BLD AUTO: 0.5 X10E3/UL (ref 0.1–0.9)
MONOCYTES NFR BLD AUTO: 8 %
NEUTROPHILS # BLD AUTO: 3.8 X10E3/UL (ref 1.4–7)
NEUTROPHILS NFR BLD AUTO: 57 %
PLATELET # BLD AUTO: 196 X10E3/UL (ref 150–379)
POTASSIUM SERPL-SCNC: 4.1 MMOL/L (ref 3.5–5.2)
PROT SERPL-MCNC: 6.9 G/DL (ref 6–8.5)
RBC # BLD AUTO: 4.5 X10E6/UL (ref 3.77–5.28)
SODIUM SERPL-SCNC: 140 MMOL/L (ref 134–144)
WBC # BLD AUTO: 6.7 X10E3/UL (ref 3.4–10.8)

## 2017-05-03 LAB — UREA BREATH TEST QL: NEGATIVE

## 2017-07-12 PROBLEM — R35.0 FREQUENCY OF URINATION: Status: ACTIVE | Noted: 2017-07-12

## 2017-07-12 PROBLEM — R39.9 LOWER URINARY TRACT SYMPTOMS (LUTS): Status: ACTIVE | Noted: 2017-07-12

## 2017-07-12 PROBLEM — R30.0 DYSURIA: Status: ACTIVE | Noted: 2017-07-12

## 2017-08-23 ENCOUNTER — OFFICE VISIT (OUTPATIENT)
Dept: INTERNAL MEDICINE CLINIC | Age: 32
End: 2017-08-23

## 2017-08-23 VITALS
BODY MASS INDEX: 19.29 KG/M2 | HEIGHT: 66 IN | TEMPERATURE: 98 F | OXYGEN SATURATION: 100 % | DIASTOLIC BLOOD PRESSURE: 82 MMHG | SYSTOLIC BLOOD PRESSURE: 121 MMHG | RESPIRATION RATE: 18 BRPM | HEART RATE: 88 BPM | WEIGHT: 120 LBS

## 2017-08-23 DIAGNOSIS — Z91.09 ENVIRONMENTAL ALLERGIES: ICD-10-CM

## 2017-08-23 DIAGNOSIS — R30.0 BURNING WITH URINATION: Primary | ICD-10-CM

## 2017-08-23 DIAGNOSIS — M54.2 NECK PAIN: ICD-10-CM

## 2017-08-23 LAB
BILIRUB UR QL STRIP: NEGATIVE
GLUCOSE UR-MCNC: NEGATIVE MG/DL
KETONES P FAST UR STRIP-MCNC: NEGATIVE MG/DL
PH UR STRIP: 5 [PH] (ref 4.6–8)
PROT UR QL STRIP: NEGATIVE MG/DL
SP GR UR STRIP: 1.02 (ref 1–1.03)
UA UROBILINOGEN AMB POC: NORMAL (ref 0.2–1)
URINALYSIS CLARITY POC: CLEAR
URINALYSIS COLOR POC: YELLOW
URINE BLOOD POC: NEGATIVE
URINE LEUKOCYTES POC: NEGATIVE
URINE NITRITES POC: NEGATIVE

## 2017-08-23 NOTE — PROGRESS NOTES
HISTORY OF PRESENT ILLNESS  Katie Dunn is a 29 y.o. female. HPI Ms. Kinjal Cruz is here for concerns about possible UTI. She does have known interstitial cystits which mimicks the UTI sx. She has had intermittent neck pain for the past year. She denies radiation of pain down her arms. She does notice that the pain at times travels up her neck to the base of her skull and is associated with occasional headaches. She works as a  and does over head reaching, occasional abrupt movements with her neck. Review of Systems   Constitutional: Negative. Respiratory: Negative. Cardiovascular: Negative. Gastrointestinal: Negative. Genitourinary: Positive for dysuria and urgency. Negative for flank pain. Musculoskeletal: Positive for neck pain. Neurological: Positive for headaches (occasional, when neck hurts). Negative for dizziness. Physical Exam   Constitutional: She is oriented to person, place, and time. HENT:   Head: Normocephalic and atraumatic. Neck: Normal range of motion. Neck supple. Cardiovascular: Normal rate. Pulmonary/Chest: Effort normal.   Neurological: She is alert and oriented to person, place, and time.      Visit Vitals    /82 (BP 1 Location: Left arm, BP Patient Position: Sitting)    Pulse 88    Temp 98 °F (36.7 °C) (Oral)    Resp 18    Ht 5' 6\" (1.676 m)    Wt 120 lb (54.4 kg)    SpO2 100%    BMI 19.37 kg/m2     Results for orders placed or performed in visit on 08/23/17   AMB POC URINALYSIS DIP STICK AUTO W/O MICRO   Result Value Ref Range    Color (UA POC) Yellow     Clarity (UA POC) Clear     Glucose (UA POC) Negative Negative    Bilirubin (UA POC) Negative Negative    Ketones (UA POC) Negative Negative    Specific gravity (UA POC) 1.025 1.001 - 1.035    Blood (UA POC) Negative Negative    pH (UA POC) 5.0 4.6 - 8.0    Protein (UA POC) Negative Negative mg/dL    Urobilinogen (UA POC) 0.2 mg/dL 0.2 - 1    Nitrites (UA POC) Negative Negative    Leukocyte esterase (UA POC) Negative Negative   Advised her of negative UA. Diagnoses and all orders for this visit:    1. Burning with urination  -     AMB POC URINALYSIS DIP STICK AUTO W/O MICRO    2. Neck pain  -     XR SPINE CERV PA LAT ODONT 3 V MAX; Future    3. Environmental allergies    Pt verbalized understanding of their condition and diagnoses, treatment plan,  as well as side effects of any new medications prescribed.

## 2017-08-23 NOTE — MR AVS SNAPSHOT
Visit Information Date & Time Provider Department Dept. Phone Encounter #  
 8/23/2017 10:00 AM Vlad Rodriguez PA-C [facility] [phone] [id number] Upcoming Health Maintenance Date Due INFLUENZA AGE 9 TO ADULT 8/24/2017* PAP AKA CERVICAL CYTOLOGY 1/19/2018 DTaP/Tdap/Td series (2 - Td) 7/1/2026 *Topic was postponed. The date shown is not the original due date. Allergies as of 8/23/2017  Review Complete On: 8/23/2017 By: Vlad Rodriguez PA-C Severity Noted Reaction Type Reactions Bactrim [Sulfamethoprim Ds]  01/12/2015    Nausea and Vomiting Macrobid [Nitrofurantoin Monohyd/m-cryst]  01/12/2015    Itching Throat tingling Current Immunizations  Never Reviewed Name Date Tdap 7/1/2016 12:40 PM  
  
 Not reviewed this visit You Were Diagnosed With   
  
 Codes Comments Burning with urination    -  Primary ICD-10-CM: R30.0 ICD-9-CM: 788.1 Neck pain     ICD-10-CM: M54.2 ICD-9-CM: 723.1 Vitals BP Pulse Temp Resp Height(growth percentile) Weight(growth percentile) 121/82 (BP 1 Location: Left arm, BP Patient Position: Sitting) 88 98 °F (36.7 °C) (Oral) 18 5' 6\" (1.676 m) 120 lb (54.4 kg) LMP SpO2 BMI OB Status Smoking Status 08/16/2017 100% 19.37 kg/m2 Having regular periods Never Smoker Vitals History BMI and BSA Data Body Mass Index Body Surface Area  
 19.37 kg/m 2 1.59 m 2 Preferred Pharmacy Pharmacy Name Phone [address], [address] [facility] [address] [place] [address] [phone] Your Updated Medication List  
  
   
This list is accurate as of: 8/23/17 10:31 AM.  Always use your most recent med list.  
  
  
  
  
 ZyrTEC 10 mg Cap Generic drug:  Cetirizine Take  by mouth. We Performed the Following AMB POC URINALYSIS DIP STICK AUTO W/O MICRO [12605 CPT(R)] To-Do List   
 08/23/2017 Imaging:  XR SPINE CERV PA LAT ODONT 3 V MAX Patient Instructions Neck: Exercises Your Care Instructions Here are some examples of typical rehabilitation exercises for your condition. Start each exercise slowly. Ease off the exercise if you start to have pain. Your doctor or physical therapist will tell you when you can start these exercises and which ones will work best for you. How to do the exercises Note: Stretching should make you feel a gentle stretch, but no pain. Stop any strengthening exercise that makes pain worse. Neck stretch 1. This stretch works best if you keep your shoulder down as you lean away from it. To help you remember to do this, start by relaxing your shoulders and lightly holding on to your thighs or your chair. 2. Tilt your head toward your shoulder and hold for 15 to 30 seconds. Let the weight of your head stretch your muscles. 3. If you would like a little added stretch, use your hand to gently and steadily pull your head toward your shoulder. For example, keeping your right shoulder down, lean your head to the left. 4. Repeat 2 to 4 times toward each shoulder. Diagonal neck stretch 1. Turn your head slightly toward the direction you will be stretching, and tilt your head diagonally toward your chest and hold for 15 to 30 seconds. 2. If you would like a little added stretch, use your hand to gently and steadily pull your head forward on the diagonal. 
3. Repeat 2 to 4 times toward each side. Dorsal glide stretch 1. Sit or stand tall and look straight ahead. 2. Slowly tuck your chin as you glide your head backward over your body 3. Hold for a count of 6, and then relax for up to 10 seconds. 4. Repeat 8 to 12 times. Note: The dorsal glide stretches the back of the neck. If you feel pain, do not glide so far back. Some people find this exercise easier to do while lying on their backs with an ice pack on the neck. Chest and shoulder stretch 1. Sit or stand tall and glide your head backward as in the dorsal glide stretch. 2. Raise both arms so that your hands are next to your ears. 3. Take a deep breath, and as you breathe out, lower your elbows down and behind your back. You will feel your shoulder blades slide down and together, and at the same time you will feel a stretch across your chest and the front of your shoulders. 4. Hold for about 6 seconds, and then relax for up to 10 seconds. 5. Repeat 8 to 12 times. Strengthening: Hands on head 1. Move your head backward, forward, and side to side against gentle pressure from your hands, holding each position for about 6 seconds. 2. Repeat 8 to 12 times. Follow-up care is a key part of your treatment and safety. Be sure to make and go to all appointments, and call your doctor if you are having problems. It's also a good idea to know your test results and keep a list of the medicines you take. Where can you learn more? Go to http://janell-nick.info/. Enter P975 in the search box to learn more about \"Neck: Exercises. \" Current as of: March 21, 2017 Content Version: 11.3 © 6943-9641 [employer] (formerly [employer]). Care instructions adapted under license by [employer] (which disclaims liability or warranty for this information). If you have questions about a medical condition or this instruction, always ask your healthcare professional. [place] [address] any warranty or liability for your use of this information. Introducing [facility]! Dear Kaci Chester: Thank you for requesting a Fablistic account. Our records indicate that you already have an active Fablistic account. You can access your account anytime at [email or web address] Did you know that you can access your hospital and ER discharge instructions at any time in Fablistic? You can also review all of your test results from your hospital stay or ER visit. Additional Information If you have questions, please visit the Frequently Asked Questions section of the [employer] website at [email or web address] [email or web address]. [email or web address] Remember, PressMatrix is NOT to be used for urgent needs. For medical emergencies, dial 911. Now available from your iPhone and Android! Please provide this summary of care documentation to your next provider. Your primary care clinician is listed as Tollie Reason. If you have any questions after today's visit, please call [phone].

## 2017-08-23 NOTE — PATIENT INSTRUCTIONS

## 2017-08-24 ENCOUNTER — TELEPHONE (OUTPATIENT)
Dept: INTERNAL MEDICINE CLINIC | Age: 32
End: 2017-08-24

## 2017-08-24 NOTE — TELEPHONE ENCOUNTER
Pt called regarding xrays. Results relayed to pt, then she had questions about her dizziness and nearly fainting. Wondering if she has something pinching. Let her knlow that you could order a carotid study but she was concerned about the dizziness again. Talked to her about her low b/p and that when she stands it could drop her B/P causing it to drop even more. Then there were more questions. Asked if she would like to come in next week to see you. She is scheduled. She asked, What if I get worse before next week. I let her know she needed to go to the Urgent care or the ER.

## 2017-08-24 NOTE — TELEPHONE ENCOUNTER
Pt. Calling in for x-ray results. She said she was trying to do the stretches given to her by  and she felt like she was going to faint and felt very nauseous.   Pt. Is very anxious about the results and would like to be called today

## 2017-08-24 NOTE — TELEPHONE ENCOUNTER
Her xray showed decrease in the normal curve of the neck likely from muscle spasm or how she was positioned. There was nothing anatomically wrong - no fracture, dislocation or arthritic changes. If she was bothered by the neck exercises, she should avoid the ones that caused her problems. I can prescribe her a muscle relaxer or if she would want to go to PT, I can order that also.

## 2017-08-24 NOTE — TELEPHONE ENCOUNTER
Spoke to pt. She will be in next week for a visit. She asked what she could do in the meantime if it gets worse, I directed her to the ER.

## 2017-09-06 DIAGNOSIS — M54.2 NECK PAIN: ICD-10-CM

## 2018-04-16 ENCOUNTER — HOSPITAL ENCOUNTER (OUTPATIENT)
Dept: PHYSICAL THERAPY | Age: 33
Discharge: HOME | End: 2018-04-16
Payer: COMMERCIAL

## 2018-04-16 PROCEDURE — 97161 PT EVAL LOW COMPLEX 20 MIN: CPT

## 2018-04-16 PROCEDURE — 97140 MANUAL THERAPY 1/> REGIONS: CPT

## 2018-04-16 PROCEDURE — 97110 THERAPEUTIC EXERCISES: CPT

## 2018-04-16 NOTE — PROGRESS NOTES
PT DAILY TREATMENT NOTE/LUMBAR EVAL 3-    Patient Name: Braulio Mohr  Date:2018  : 1985  [x]  Patient  Verified  Payor: Lisa Deleon / Plan: [employer] / Product Type: HMO /    In time: 3:05  Out time: 3:45  Total Treatment Time (min): 40  Total Timed Codes (min): 16     Visit #: 1 of 6-12    Treatment Area: Low back pain [M54.5]  SUBJECTIVE  Pain Level (0-10 scale): 0/10 now. 4/10 when she feels it  []constant []intermittent []improving []worsening []no change since onset    Any medication changes, allergies to medications, adverse drug reactions, diagnosis change, or new procedure performed?: [x] No    [] Yes (see summary sheet for update)  Subjective functional status/changes:     Mechanism of Injury: bursts of pain. Initially had pain down leg and sometimes groin. 2017. Went to chiropractor and had pain since then. Went for upper back. Pain is on right side of back. Sitting for a long time increases pain. Morning sickness has made her sit more. Yoga increases symptoms. 2 months pregnant. Standing too long also bother it. Symptoms are intermittent. Laying down and moving hips some increases pain. No difficulty with bowel and bladder. Denies numbness/tingling. 1st pregnancy. Work Hx:  Works as . Living Situation: lives with   Pt Goals: to have less  Motivation: googd  Cognition: A & O x 4    Other:    OBJECTIVE/EXAMINATION    8 min Therapeutic Exercise:  [] See flow sheet : HEP   Rationale: increase strength, improve coordination and improve balance to improve the patients ability to increase ease of ambulation     8 min Manual Therapy:  Generalized SIJ MET   Rationale: decrease pain, increase ROM and increase tissue extensibility to increase ease of ADLs.            With   [x] TE   [] TA   [] neuro   [] other: Patient Education: [x] Review HEP    [] Progressed/Changed HEP based on:   [] positioning   [] body mechanics   [] transfers   [] heat/ice application    [] other:      Physical Therapy Evaluation - Lumbar Spine ([facility])    OBJECTIVE  Posture:  Lateral Shift: [] R    [] L     [] +  [] -  Kyphosis: [] Increased [] Decreased   []  WNL  Lordosis:  [] Increased [] Decreased   [] WNL  Pelvic symmetry: [x] WNL    [] Other:    Gait:  [x] Normal     [] Abnormal:    Active Movements: [] N/A   [] Too acute   [] Other:  ROM % AROM % PROM Comments:pain, area   Forward flexion 40-60 100     Extension 20-30 100     SB right 20-30 100     SB left 20-30 100     Rotation right 5-10 100     Rotation left 5-10 100       Neuro Screen [x] WNL  Myotome/Dermatome/Reflexes:  Comments:    Dural Mobility:  SLR Sitting: [] R    [] L    [] +    [] -  @ (degrees):           Supine: [x] R    [x] L    [] +    [x] -  @ (degrees):   Slump Test: [] R    [] L    [] +    [] -  @ (degrees):   Prone Knee Bend: [x] R    [x] L    [] +    [x] -     Palpation: no tenderness to palpation   [] Min  [] Mod  [] Severe    Location:  [] Min  [] Mod  [] Severe    Location:  [] Min  [] Mod  [] Severe    Location:    Strength   L(0-5) R (0-5) N/T   Hip Flexion (L1,2) 4+ 4+ []   Knee Extension (L3,4) 5 5 []   Ankle Dorsiflexion (L4) 5 5 []   Great Toe Extension (L5)   []   Ankle Plantarflexion (S1) 5 5 []   Knee Flexion (S1,2) 5 5 []   Upper Abdominals   []   Lower Abdominals   []   Paraspinals   []   Back Rotators   []   Gluteus Harvey 4 4 []   Glut med 4 4 []     Special Tests  Lumbar:  Lumb.  Compression: [] Pos  [] Neg               Lumbar Distraction:   [] Pos  [] Neg    Quadrant:  [x] Pos  [] Neg   [] Flex  [] Ext to right    Sacroilliac:  Gaenslen's: [] R    [] L    [] +    [] -     Compression: [x] +    [] -     Gapping:  [x] +    [] -     Thigh Thrust: [x] R    [] L    [x] +    [] -            Hip: Beather Slate:  [x] R    [] L    [x] +    [] -     Scour:  [x] R    [x] L    [] +    [x] -     Piriformis: [] R    [] L    [] +    [] -     Other tests/comments:  ASLR test positive on right       Pain Level (0-10 scale) post treatment:  0/10    ASSESSMENT/Changes in Function:      [x]  See Plan of Care  []  See progress note/recertification  []  See Discharge Summary         Progress towards goals / Updated goals:  See POC    PLAN  []  Upgrade activities as tolerated     [x]  Continue plan of care  []  Update interventions per flow sheet       []  Discharge due to:_  []  Other:_      Nelly Moody, PT 4/16/2018  3:06 PM

## 2018-04-16 NOTE — PROGRESS NOTES
[facility]  [address] [place] [address]  [phone] fax    Plan of Care/ Statement of Necessity for Physical Therapy Services    Patient name: Mo Guerrero Start of Care: 2018   Referral source: Rahat Kalani : 1985    Medical Diagnosis: Low back pain [M54.5]   Onset Date: 2017    Treatment Diagnosis:  Low back pain   Prior Hospitalization: see medical history Provider#: [id number]   Medications: Verified on Patient summary List    Comorbidities: 2 months pregnant    Prior Level of Function: working as a , yoga     The Plan of Care and following information is based on the information from the initial evaluation. Assessment/ key information:  Pt. Is a 29year old female c/o back pain that began in 2017 after chiropractor manipulation. She reports initially has symptoms down her left leg but now pain is mainly in right side of her back. She has increased pain with prolonged sitting and standing. She presents with good lumbar mobility. Mild pain with quadrant testing into right extension. Negative neural tension testing. No myotome involvement was noted. Positive right SIJ cluster testing. Positive right ASLR test. She also presents with decreased hip strength. She was provided with a SI belt. Skilled PT is medically necessary in order to improve hip and core stability for decreased pain and return to PLOF. Evaluation Complexity History MEDIUM  Complexity : 1-2 comorbidities / personal factors will impact the outcome/ POC ; Examination MEDIUM Complexity : 3 Standardized tests and measures addressing body structure, function, activity limitation and / or participation in recreation  ;Presentation MEDIUM Complexity : Evolving with changing characteristics  ; Clinical Decision Making LOW Complexity : FOTO score of   Overall Complexity Rating: LOW   Problem List: pain affecting function, decrease ROM, decrease strength, impaired gait/ balance, decrease ADL/ functional abilitiies, decrease activity tolerance, decrease flexibility/ joint mobility and decrease transfer abilities   Treatment Plan may include any combination of the following: Therapeutic exercise, Therapeutic activities, Neuromuscular re-education, Physical agent/modality, Gait/balance training, Manual therapy, Patient education, Self Care training and Functional mobility training  Patient / Family readiness to learn indicated by: asking questions  Persons(s) to be included in education: patient (P)  Barriers to Learning/Limitations: None  Patient Goal (s): to have less pain   Patient Self Reported Health Status: good  Rehabilitation Potential: good    Short Term Goals: To be accomplished in 1 weeks:  1. Patient will demonstrate compliance with HEP in order to improve hip strength    Long Term Goals: To be accomplished in 6 weeks:  1. Patient will improve FOTO score by 2 points in order to demonstrate a significant improvement in function. 2. Patient will improve B hip ext/abd MMT to 4+/5 in order to increase ease of working. 3. Patient will report pain at 2/10 or less during prolonged sitting and working in order to improve quality of life. 4. Patient will have negative ASLR test in order to demonstrate improved SIJ stability and increase ease of transfers. Frequency / Duration: Patient to be seen 1-2 times per week for 6 weeks. Patient/ CarPatient/ Caregiver education and instruction: Diagnosis, prognosis, exercises   [x]  Plan of care has been reviewed with LIZABETH Muñiz, PT 4/16/2018 3:56 PM    ________________________________________________________________________    I certify that the above Therapy Services are being furnished while the patient is under my care. I agree with the treatment plan and certify that this therapy is necessary.     Physician's Signature:____________________ Date:____________Time: _________    Please sign and return to [facility]  [address] [place] [address]  [phone] fax

## 2018-04-19 ENCOUNTER — HOSPITAL ENCOUNTER (OUTPATIENT)
Dept: PHYSICAL THERAPY | Age: 33
Discharge: HOME | End: 2018-04-19
Payer: COMMERCIAL

## 2018-04-19 PROCEDURE — 97110 THERAPEUTIC EXERCISES: CPT

## 2018-04-19 PROCEDURE — 97140 MANUAL THERAPY 1/> REGIONS: CPT

## 2018-04-19 NOTE — PROGRESS NOTES
PT DAILY TREATMENT NOTE     Patient Name: Bonnie Peter  Date:2018  : 1985  [x]  Patient  Verified  Payor: Waldemar Chase / Plan: [employer] HMO / Product Type: HMO /    In time: 3:31  Out time: 4:12  Total Treatment Time (min): 41  Visit #: 2 of     Treatment Area: Low back pain [M54.5]    SUBJECTIVE  Pain Level (0-10 scale): 2/10  Any medication changes, allergies to medications, adverse drug reactions, diagnosis change, or new procedure performed?: [x] No    [] Yes (see summary sheet for update)  Subjective functional status/changes:   [] No changes reported  Pt reports a nagging pain in her right buttocks area. She has been wearing her SI belt for walking. She is 2 months pregnant now. She is doing her exercises at home and is a .      OBJECTIVE    31 min Therapeutic Exercise:  [x] See flow sheet :   Rationale: increase ROM and increase strength to improve the patients ability to ambulate and perform ADLs with less pain    10 min Manual Therapy:  MET for Left upslip; MET for Left rotation; L/L sacral torsion corrected with MWM   Rationale: decrease pain and increase ROM to improve ease of ambulation and performing ADLs          With   [] TE   [] TA   [] neuro   [] other: Patient Education: [x] Review HEP    [] Progressed/Changed HEP based on:   [] positioning   [] body mechanics   [] transfers   [] heat/ice application    [] other:      Other Objective/Functional Measures:   Slight decrease in pain post manual correction  Educated on even weightbearing for standing, carrying groceries, carrying purse  Instructed in core tight throughout the day  Discussed correcting pelvis with self correction if continues to present with same malalignment until muscles are strong enough to stabilize  No increased pain during session     Pain Level (0-10 scale) post treatment: 1-2/10    ASSESSMENT/Changes in Function: Initiated exercise program per POC towards established goals. Pt continues to present with pelvic obliquity and resultant Right SI pain. She has been compliant with exercises and use of SI belt. Will continue to work on hip and core strength to maintain stability for decreased pain and ease of performing work duties. Patient will continue to benefit from skilled PT services to modify and progress therapeutic interventions, address functional mobility deficits, address ROM deficits, address strength deficits, analyze and address soft tissue restrictions, analyze and cue movement patterns, analyze and modify body mechanics/ergonomics, assess and modify postural abnormalities, address imbalance/dizziness and instruct in home and community integration to attain remaining goals. Progress towards goals / Updated goals:  Short Term Goals: To be accomplished in 1 weeks:  1. Patient will demonstrate compliance with HEP in order to improve hip strength Met  Long Term Goals: To be accomplished in 6 weeks:  1. Patient will improve FOTO score by 2 points in order to demonstrate a significant improvement in function. 2. Patient will improve B hip ext/abd MMT to 4+/5 in order to increase ease of working. 3. Patient will report pain at 2/10 or less during prolonged sitting and working in order to improve quality of life. 4. Patient will have negative ASLR test in order to demonstrate improved SIJ stability and increase ease of transfers.      PLAN  [x]  Upgrade activities as tolerated     [x]  Continue plan of care  []  Update interventions per flow sheet       []  Discharge due to:_  []  Other:_      Darleen Alarcon PTA 4/19/2018  2:57 PM    Future Appointments  Date Time Provider Joann Molina   4/19/2018 3:30 PM Darleen Alarcon PTA [facility]   4/27/2018 4:00 PM Darleen Alarcon PTA [facility]

## 2018-04-27 ENCOUNTER — HOSPITAL ENCOUNTER (OUTPATIENT)
Dept: PHYSICAL THERAPY | Age: 33
Discharge: HOME | End: 2018-04-27
Payer: COMMERCIAL

## 2018-04-27 PROCEDURE — 97140 MANUAL THERAPY 1/> REGIONS: CPT

## 2018-04-27 PROCEDURE — 97110 THERAPEUTIC EXERCISES: CPT

## 2018-04-27 NOTE — PROGRESS NOTES
PT DAILY TREATMENT NOTE     Patient Name: Frankie Edwards  Date:2018  : 1985  [x]  Patient  Verified  Payor: Olive Mesacker / Plan: [employer] HMO / Product Type: HMO /    In time:4:02  Out time: 4:51  Total Treatment Time (min): 49  Visit #: 3 of     Treatment Area: Low back pain [M54.5]    SUBJECTIVE  Pain Level (0-10 scale): 2/10  Any medication changes, allergies to medications, adverse drug reactions, diagnosis change, or new procedure performed?: [x] No    [] Yes (see summary sheet for update)  Subjective functional status/changes:   [] No changes reported  Pt reports the other day she had a flare up of pain after her exercises so she rested the last few days and feels better. She wants to know what to do if her alignment goes out.      OBJECTIVE    34 min Therapeutic Exercise:  [x] See flow sheet :   Rationale: increase ROM and increase strength to improve the patients ability to stabilize the hips for neutral alignment with decreased pain for ambulation    15 min Manual Therapy:  MET for Left upslip; MWM for L/L sacral torsion; pt education on self correction of left upslip and for correction of sacral torsion   Rationale: decrease pain, increase ROM and increase tissue extensibility to improve ease of ambulation          With   [] TE   [] TA   [] neuro   [] other: Patient Education: [x] Review HEP    [] Progressed/Changed HEP based on:   [] positioning   [] body mechanics   [] transfers   [] heat/ice application    [] other:      Other Objective/Functional Measures:   Decreased pain post manual correction  Verbally educated pt on correction of alignment; will review next session  Reviewed log rolling for transfers  Educated on squats for mechanics due to pregnancy to avoid trunk bending  Educated on PPT in supine; pt verbally told to practice in S/L, sitting and standing as well  Instructed on bridges and supine ball/band versus SLS exercises to help maintain alignment    Pain Level (0-10 scale) post treatment: 0/10    ASSESSMENT/Changes in Function: Pt making steady progress towards initial goals. She is compliant with exercises and therapy recommendations. She continues to have rotated sacrum due to hip/core weakness. Will continue progressing stability for ease of ambulation for work and recreation with less pain. Patient will continue to benefit from skilled PT services to modify and progress therapeutic interventions, address functional mobility deficits, address ROM deficits, address strength deficits, analyze and address soft tissue restrictions, analyze and cue movement patterns, analyze and modify body mechanics/ergonomics, assess and modify postural abnormalities, address imbalance/dizziness and instruct in home and community integration to attain remaining goals. Progress towards goals / Updated goals:  Short Term Goals: To be accomplished in 1 weeks:  1. Patient will demonstrate compliance with HEP in order to improve hip strength Met  Long Term Goals: To be accomplished in 6 weeks:  1. Patient will improve FOTO score by 2 points in order to demonstrate a significant improvement in function. 2. Patient will improve B hip ext/abd MMT to 4+/5 in order to increase ease of working. 3. Patient will report pain at 2/10 or less during prolonged sitting and working in order to improve quality of life. Progressing 2/10 last two visits (4/27/18)  4. Patient will have negative ASLR test in order to demonstrate improved SIJ stability and increase ease of transfers. PLAN  [x]  Upgrade activities as tolerated     [x]  Continue plan of care  []  Update interventions per flow sheet       []  Discharge due to:_  []  Other:_      Collin Rivera PTA 4/27/2018  4:12 PM    No future appointments.

## 2018-05-02 ENCOUNTER — APPOINTMENT (OUTPATIENT)
Dept: PHYSICAL THERAPY | Age: 33
End: 2018-05-02

## 2018-05-08 ENCOUNTER — APPOINTMENT (OUTPATIENT)
Dept: PHYSICAL THERAPY | Age: 33
End: 2018-05-08

## 2018-06-06 NOTE — PROGRESS NOTES
[facility] Bernarda Sacks  [address] [facility]  [phone] fax    Physical Therapy Discharge Summary    Patient name: Suzanne Black Start of Care:  18   Referral source: Maral Looney : 1985   Medical/Treatment Diagnosis: Low back pain [M54.5] Onset Date: 2017     Prior Hospitalization: see medical history Provider#: [id number]   Medications: Verified on Patient Summary List    Comorbidities:  2 months pregnant  Prior Level of Function: working as a , yoga       Visits from [place] of Care: 3    Missed Visits: 1    Reporting Period : 18 to  18    Summary of Care:  Goal: Patient will improve FOTO score by 2 points in order to demonstrate a significant improvement in function. Status at last note/certification: 83  Status at discharge: not met    Goal: Patient will improve B hip ext/abd MMT to 4+/5 in order to increase ease of working. Status at last note/certification:   Status at discharge: not met    Goal: Patient will report pain at 2/10 or less during prolonged sitting and working in order to improve quality of life. Status at last note/certification:   Status at discharge: not met    Goal: Patient will have negative ASLR test in order to demonstrate improved SIJ stability and increase ease of transfers. Status at last note/certification: positive  Status at discharge: not met    Pt. Was seen for 3 visits and then did not return to PT. She was educated on a HEP at evaluation and provided with an SI belt. Goals were unable to be re-assessed secondary to unplanned D/C.        ASSESSMENT/RECOMMENDATIONS:  [x]Discontinue therapy: [x]Patient has reached or is progressing toward set goals      []Patient is non-compliant or has abdicated      []Due to lack of appreciable progress towards set goals    Colton Chong, PT 2018 2:05 PM

## 2018-10-05 ENCOUNTER — HOSPITAL ENCOUNTER (OUTPATIENT)
Dept: PHYSICAL THERAPY | Age: 33
Discharge: HOME | End: 2018-10-05
Payer: COMMERCIAL

## 2018-10-05 PROCEDURE — 97162 PT EVAL MOD COMPLEX 30 MIN: CPT

## 2018-10-05 PROCEDURE — 97140 MANUAL THERAPY 1/> REGIONS: CPT

## 2018-10-05 NOTE — PROGRESS NOTES
PT DAILY TREATMENT NOTE/LUMBAR EVAL 10-18 Patient Name: Gwendolyn Dry Date:10/5/2018 : 1985 [x]  Patient  Verified Payor: Shilpi Lazar / Plan: [employer] HMO / Product Type: HMO /   
In time: 11:12  Out time: 11:48 Total Treatment Time (min): 36 Visit #: 1 of 12 Medicare/BCBS Only Total Timed Codes (min):  20 1:1 Treatment Time:  36  
 
Treatment Area: Right hip pain [M25.551] SUBJECTIVE Pain Level (0-10 scale): 0/10 []constant []intermittent []improving []worsening []no change since onset Any medication changes, allergies to medications, adverse drug reactions, diagnosis change, or new procedure performed?: [x] No    [] Yes (see summary sheet for update) Subjective functional status/changes:    
PLOF: living with family, 4 IZZY, full FOS to 2nd floor Limitations to PLOF:  
Mechanism of Injury:  
Current symptoms/Complaints: Ms. Talisha Burnette is a 35 y/o F pt with CC of Left hip/SI joint pain. Pt is at third trimester; estimate due date is 2018. Pt had to hold off PT last time due to illness. Previous Treatment/Compliance: PMHx/Surgical Hx:  
Work Hx:  
Living Situation:  
Pt Goals: \"realign hip\" Barriers: []pain []financial []time []transportation []other Motivation:  
Substance use: []Alcohol []Tobacco []other:  
FABQ Score: []low []elevate Cognition: A & O x     Other: OBJECTIVE/EXAMINATION Domestic Life:  
Activity/Recreational Limitations:  
Mobility:  
Self Care:  
 
16 min [x]Eval                  []Re-Eval  
 
 
5 min Therapeutic Activity:  []  See flow sheet :Pt edu within scope of practice on prognosis, POC, l/s and pelvic alignment, PFD's contribution to back pain; modalities use. Rationale: increase ROM, increase strength, improve coordination and increase proprioception  to improve the patients ability to amb with ease 15 min Manual Therapy:  Myofascial pull to correct Right up slip, MET with flex and ext component, followed by shotgun Rationale: decrease pain, increase ROM, increase tissue extensibility, decrease edema , correct positional vertigo and increase postural awareness to improve pt's tolerance for amb/ADLs With 
 [] TE 
 [] TA 
 [] neuro 
 [] other: Patient Education: [x] Review HEP [] Progressed/Changed HEP based on:  
[] positioning   [] body mechanics   [] transfers   [] heat/ice application   
[] other:   
 
Other Objective/Functional Measures:  
 
Physical Therapy Evaluation - Lumbar Spine ([facility]) SUBJECTIVE Chief Complaint: 
 
Mechanism of injury: 
 
Symptoms: 
Aggravated by: 
 [] Bending [] Sitting [] Standing [] Walking 
 [] Moving [] Cough [] Sneeze [] Valsalva 
 [] AM  [] PM  Lying:  [] sup   [] pro   [] sidelying 
 [] Other: 
  
Eased by:  
 [] Bending [] Sitting [] Standing [] Walking 
 [] Moving [] AM  [] PM  Lying: [] sup  [] pro  [] sidelying 
 [] Other: 
  
General Health:  Red Flags Indicated? [] Yes    [] No 
[] Yes [] No Recent weight change (If yes, due to dieting? [] Yes  [] No) [] Yes [x] No Weakness in legs during walking 
[] Yes [] No Unremitting pain at night 
[] Yes [] No Abdominal pain or problems 
[] Yes [] No Rectal bleeding 
[] Yes [] No Feet more cold or painful in cold weather 
[] Yes [] No Menstrual irregularities 
[] Yes [] No Blood or pain with urination 
[] Yes [x] No Dysfunction of bowel or bladder 
[] Yes [] No Recent illness within past 3 weeks (i.e, cold, flu) 
[] Yes [x] No Numbness/tingling in buttock/genitalia region Past History/Treatments:  
 
Diagnostic Tests: [] Lab work [] X-rays    [] CT [] MRI     [] Other: 
Results: 
 
Functional Status Prior level of function: 
Present functional limitations: 
What position do you sleep in?: 
 
OBJECTIVE Posture: Normal 
Lateral Shift: [] R    [] L     [] +  [] - 
Kyphosis: [] Increased [] Decreased   []  WNL Lordosis:  [] Increased [] Decreased   [] WNL Pelvic symmetry: [] WNL    [] Other: Right upslip, Left PI? Gait:  [] Normal     [] Abnormal: 
 
Active Movements: [] N/A   [] Too acute   [] Other: ROM % AROM % PROM Comments:pain, area Forward flexion 40-60 [facility] Extension 20-30 WFL    
SB right 20-30 WFL    
SB left 20-30 [facility] Rotation right 5-10 [facility] Rotation left 5-10 [facility] Repeated Movements Effects on present pain: produces (CT), abolishes (A), increases (incr), decreases (decr), centralizes (C), peripheral (PH), no effect (NE) Pre-Test Sx Flexion Repeated Flexion Extension Repeated Extension Repeated SBL Repeated SBR Sitting Standing Lying      N/A N/A Comments: 
Side Glide: 
Sustained passive positioning test: 
 
Neuro Screen [x] WNL Myotome/Dermatome Reflexes: 
Comments: 
 
Dural Mobility: SLR Sitting: [] R    [] L    [] +    [] -  @ (degrees):  
        Supine: [] R    [] L    [] +    [] -  @ (degrees):  
Slump Test: [] R    [] L    [] +    [] -  @ (degrees): Prone Knee Bend: [] R    [] L    [] +    [] -  
 
Palpation [] Min  [] Mod  [] Severe    Location: 
[] Min  [] Mod  [] Severe    Location: 
[] Min  [] Mod  [] Severe    Location: 
 
Strength 
 L(0-5) R (0-5) N/T Hip Flexion (L1,2) 4+ 4+ [] Knee Extension (L3,4) 5 5 [] Ankle Dorsiflexion (L4) 5 5 [] Great Toe Extension (L5) ~5 ~5 [] Ankle Plantarflexion (S1) ~4 ~4 [] Knee Flexion (S1,2) ~4 ~4 [] Upper Abdominals   [] Lower Abdominals   [] Paraspinals   [] Back Rotators   [] Gluteus Harvey ~3+ ~3+ [] Other   [] Special Tests Lumbar:  Lumb. Compression: [] Pos  [] Neg            
  Lumbar Distraction:   [] Pos  [] Neg 
  Quadrant:  [] Pos  [] Neg   [] Flex  [] Ext Sacroilliac:  Antonylen's: [] R    [] L    [] +    [] -  
  Compression: [] +    [] -  
  Gapping:  [] +    [] - Thigh Thrust: [] R    [] L    [] +    [] - Leg Length: [] +    [] -   Position: 
  Crests: 
  ASIS: 
  PSIS: 
  Sacral Sulcus: Mobility: Standing flex: 
   Sitting flex: 
   Supine to sit: 
   Prone knee bend: 
 
     Hip: Aniya Squire:  [] R    [] L    [] +    [] - Scour:  [] R    [] L    [] +    [] - Piriformis: [x] R    [] L    [x] +    [] - Deficits: Familia's: [] R    [] L    [] +    [] - Tim Rangelnister: [] R    [] L    [] +    [] - Hamstrings 90/90: mod tightness B Gastrocsoleus (to neutral): Right: Left: 
    
Global Muscular Weakness: 
Abdominals: 
Quadratus Lumborum: 
Paraspinals: Other: 
 
Other tests/comments: 
   BP: 101/75 mmHg; HR: 90 bpm 
 
 
Pain Level (0-10 scale) post treatment: 0-1/10 ASSESSMENT/Changes in Function: see POC Patient will continue to benefit from skilled PT services to modify and progress therapeutic interventions, address functional mobility deficits, address ROM deficits, address strength deficits, analyze and address soft tissue restrictions, analyze and cue movement patterns, analyze and modify body mechanics/ergonomics, assess and modify postural abnormalities, address imbalance/dizziness and instruct in home and community integration to attain remaining goals. [x]  See Plan of Care 
[]  See progress note/recertification 
[]  See Discharge Summary Progress towards goals / Updated goals: 
See POC PLAN [x]  Upgrade activities as tolerated     [x]  Continue plan of care 
[]  Update interventions per flow sheet      
[]  Discharge due to:_ 
[]  Other:_ Shailesh Hernandez, PT 10/5/2018  11:12 AM

## 2018-10-05 NOTE — PROGRESS NOTES
[facility] [address] [facility] 
[phone] fax Plan of Care/ Statement of Necessity for Physical Therapy Services Patient name: Arie Slater Start of Care: 10/5/2018 Referral source: [facility], Not On File, MD : 1985 Medical Diagnosis: Right hip pain [M25.551] Onset Date: about 8-9 months Treatment Diagnosis: LBP, hip, SI joint pain Prior Hospitalization: see medical history Provider#: [id number] Medications: Verified on Patient summary List  
 Comorbidities: pregnant Prior Level of Function: living with family, 4 IZZY, full FOS to 2nd floor The Plan of Care and following information is based on the information from the initial evaluation. Assessment/ koehler information: Ms. Erich Quinn is a 35 y/o F pt with CC of Left hip/SI joint pain. Pt is at third trimester; estimate due date is 2018. Pt had to hold off PT last time due to illness. Pt presented with min tightness of B hips and paraspinal muscles, worst with Right side, mod upslip of Right, and pain with Left SI joint. WNL with dermatomes and myotomes screening. Pt would benefit from skilled PT to address these deficits above for pain free functional mobility. Evaluation Complexity History MEDIUM  Complexity : 1-2 comorbidities / personal factors will impact the outcome/ POC ; Examination MEDIUM Complexity : 3 Standardized tests and measures addressing body structure, function, activity limitation and / or participation in recreation  ;Presentation MEDIUM Complexity : Evolving with changing characteristics  ; Clinical Decision Making MEDIUM Complexity : FOTO score of 26-74 Overall Complexity Rating: MEDIUM Problem List: pain affecting function, decrease ROM, decrease strength, edema affecting function, impaired gait/ balance, decrease ADL/ functional abilitiies, decrease activity tolerance, decrease flexibility/ joint mobility and decrease transfer abilities Treatment Plan may include any combination of the following: Therapeutic exercise, Therapeutic activities, Neuromuscular re-education, Physical agent/modality, Gait/balance training, Manual therapy, Patient education, Self Care training, Functional mobility training, Home safety training and Stair training Patient / Family readiness to learn indicated by: asking questions, trying to perform skills and interest 
Persons(s) to be included in education: patient (P) Barriers to Learning/Limitations: None Patient Goal (s): realign hip Patient Self Reported Health Status: good Rehabilitation Potential: good Short term goals: To be accomplished within 1 week 1. Pt will be independent with HEP to maintain progression. Long term goals: To be accomplished within 6 weeks 1. Pt will improve FOTO score by 7 points to 76/100 to show improvement with functional mobility performance. 2. Pt will report no more than 2-3/10 to improve QOL. 3. Pt will be able to amb/jogging for at least 10 min with no increased of pain so she can navigate community and perform recreational activities with ease. Frequency / Duration: Patient to be seen 2 times per week for 6 weeks. Patient/ CarPatient/ Caregiver education and instruction: Diagnosis, prognosis, self care, activity modification, brace/ splint application and exercises 
 [x]  Plan of care has been reviewed with PTA Thienphuc Everitt Lesches, PT 10/5/2018 11:59 AM 
 
________________________________________________________________________ I certify that the above Therapy Services are being furnished while the patient is under my care. I agree with the treatment plan and certify that this therapy is necessary. [de-identified] Signature:____________Date:_________TIME:________ 
 
[employer], Date and Time must be completed for valid certification ** Please sign and return to In [facility] [address] [facility] 
[phone] fax

## 2018-10-10 ENCOUNTER — HOSPITAL ENCOUNTER (OUTPATIENT)
Dept: PHYSICAL THERAPY | Age: 33
Discharge: HOME | End: 2018-10-10
Payer: COMMERCIAL

## 2018-10-10 PROCEDURE — 97140 MANUAL THERAPY 1/> REGIONS: CPT

## 2018-10-10 PROCEDURE — 97110 THERAPEUTIC EXERCISES: CPT

## 2018-10-10 NOTE — PROGRESS NOTES
PT DAILY TREATMENT NOTE - [facility]  Patient Name: Luda Botello Date:10/10/2018 : 1985 [x]  Patient  Verified Payor: Naseem Metz / Plan: [employer] HMO / Product Type: HMO /   
In time: 11:00  Out time: 11:30 Total Treatment Time (min):  30 Total Timed Codes (min): 30 Visit #: 2 of 12 Treatment Area: Right hip pain [M25.551] SUBJECTIVE Pain Level (0-10 scale):  5/10 Any medication changes, allergies to medications, adverse drug reactions, diagnosis change, or new procedure performed?: [x] No    [] Yes (see summary sheet for update) Subjective functional status/changes:   [] No changes reported Pt. Reports her HEP went well but had pain with LTR exercise. OBJECTIVE 22 min Therapeutic Exercise:  [x] See flow sheet :  
Rationale: increase ROM and increase strength to improve the patients ability to increase ease of ADLs 8 min Manual Therapy:  MET for left anterior rotated innominate Rationale: decrease pain, increase ROM and increase tissue extensibility to increase ease of ALDs With 
 [x] TE 
 [] TA 
 [] neuro 
 [] other: Patient Education: [x] Review HEP [] Progressed/Changed HEP based on:  
[] positioning   [] body mechanics   [] transfers   [] heat/ice application   
[] other:   
 
Other Objective/Functional Measures:  
Pt. Was educated on hold LTR exercise for right now She was educated on how to perform QL stretch on step Pt. Was educated on using SI belt for prolonged walking Slight left anterior rotated innominate noted, with no significant change immediately after manual  
 
Pain Level (0-10 scale) post treatment: 5/10 ASSESSMENT/Changes in Function:  Pt. Initiated PT and is compliant with her HEP.   
 
Patient will continue to benefit from skilled PT services to modify and progress therapeutic interventions, address functional mobility deficits, address ROM deficits, address strength deficits, analyze and address soft tissue restrictions, analyze and cue movement patterns and analyze and modify body mechanics/ergonomics to attain remaining goals. Progress towards goals / Updated goals: 
Short term goals: To be accomplished within 1 week 1. Pt will be independent with HEP to maintain progression. met 
  
Long term goals: To be accomplished within 6 weeks 1. Pt will improve FOTO score by 7 points to 76/100 to show improvement with functional mobility performance. 2. Pt will report no more than 2-3/10 to improve QOL. 3. Pt will be able to amb/jogging for at least 10 min with no increased of pain so she can navigate community and perform recreational activities with ease. PLAN 
[]  Upgrade activities as tolerated     [x]  Continue plan of care 
[]  Update interventions per flow sheet      
[]  Discharge due to:_ 
[]  Other:_ Donald Aburto, PT 10/10/2018  11:00 AM 
 
Future Appointments Date Time Provider Joann Molina 10/15/2018 2:30 PM Kyra Musa, PT [email or web address]  
10/18/2018 11:30 AM Donald Aburto, PT [id number] [facility]  
10/22/2018 3:00 PM Manuel Reyes, PT [id number] [facility]  
10/26/2018 1:00 PM Jerry Nur, PTA [id number] [facility]  
10/29/2018 2:30 PM Donald Aburto, PT [id number] [facility]  
11/1/2018 2:30 PM Kyra Musa, PT [id number] [facility]  
11/5/2018 11:30 AM Donald Aburto, PT [id number] [facility]  
11/12/2018 11:00 AM Kyra Musa, PT [id number] [facility]

## 2018-10-15 ENCOUNTER — APPOINTMENT (OUTPATIENT)
Dept: PHYSICAL THERAPY | Age: 33
End: 2018-10-15
Payer: COMMERCIAL

## 2018-10-18 ENCOUNTER — APPOINTMENT (OUTPATIENT)
Dept: PHYSICAL THERAPY | Age: 33
End: 2018-10-18
Payer: COMMERCIAL

## 2018-10-18 ENCOUNTER — HOSPITAL ENCOUNTER (OUTPATIENT)
Dept: PHYSICAL THERAPY | Age: 33
Discharge: HOME | End: 2018-10-18
Payer: COMMERCIAL

## 2018-10-18 PROCEDURE — 97110 THERAPEUTIC EXERCISES: CPT

## 2018-10-18 NOTE — PROGRESS NOTES
PT DAILY TREATMENT NOTE 10-18 Patient Name: Raul Nolan Date:10/18/2018 : 1985 [x]  Patient  Verified Payor: Luzmaria Díazridge / Plan: [employer] HMO / Product Type: HMO /   
In time:2:05  Out time:2:29 Total Treatment Time (min): 24 Visit #: 3 of 12 Treatment Area: Low back pain [M54.5] Pain in right hip [M25.551] SUBJECTIVE Pain Level (0-10 scale): 0/10 Any medication changes, allergies to medications, adverse drug reactions, diagnosis change, or new procedure performed?: [x] No    [] Yes (see summary sheet for update) Subjective functional status/changes:   [] No changes reported Pt reports that after physical therapy last time she had a lot of cramps in her stomach, but it's been better. The cramps subsided the next day following therapy. She saw her OBGYN who said everything was fine and the cramps were not abnormal.  Pt reports her due date is 18 OBJECTIVE 24 min Therapeutic Exercise:  [x] See flow sheet :  
Rationale: increase ROM and increase strength to improve the patients ability to improve ease of prolonged standing/ambulatoin With 
 [] TE 
 [] TA 
 [] neuro 
 [] other: Patient Education: [x] Review HEP [] Progressed/Changed HEP based on:  
[] positioning   [] body mechanics   [] transfers   [] heat/ice application   
[x] other: educated pt regarding hormone relaxin contributing to SI instability, advised pt to be cleared by MD before resuming exercises following childbirth and avoid bridge position following childbirth Other Objective/Functional Measures:  
 
Right upslip corrected with leg lengthening exercise Pt advised to do leg lengthening exercise at home when experiencing SI pain Pt was pleased with stretches No pain with interventions Pain Level (0-10 scale) post treatment: 0/10 ASSESSMENT/Changes in Function:  
 
Pt is making slow, steady progress towards initial goals in therapy.   She continues to present with right upslip and was educated on leg lengthening exercise to independently manage continued upslip. Will continue to address flexibility and hip stability deficits to reduce pain with ambulation/activity and improve QOL. Patient will continue to benefit from skilled PT services to modify and progress therapeutic interventions, address functional mobility deficits, address ROM deficits, address strength deficits, analyze and address soft tissue restrictions, analyze and cue movement patterns, assess and modify postural abnormalities and instruct in home and community integration to attain remaining goals. Progress towards goals / Updated goals: 
Short term goals: To be accomplished within 1 week 1. Pt will be independent with HEP to maintain progression. met 
  
Long term goals: To be accomplished within 6 weeks 1. Pt will improve FOTO score by 7 points to 76/100 to show improvement with functional mobility performance.                   
2. Pt will report no more than 2-3/10 to improve QOL. 3. Pt will be able to amb/jogging for at least 10 min with no increased of pain so she can navigate community and perform recreational activities with ease. PLAN 
[]  Upgrade activities as tolerated     [x]  Continue plan of care 
[]  Update interventions per flow sheet      
[]  Discharge due to:_ 
[]  Other:_ Connor Flaherty, PT 10/18/2018  2:07 PM 
 
Future Appointments Date Time Provider Joann Tracy 10/22/2018  3:00 PM Emeli Lofton, PT MMCPTPB [facility]  
10/26/2018  1:00 PM Claribel Munson, PTA [id number] [facility]  
10/29/2018  2:30 PM Tamika Coburn, PT [id number] [facility]  
11/1/2018  2:30 PM Walker Trammell, PT HQOLIRS [facility]  
11/5/2018 11:30 AM Tamika Coburn, PT [id number] [facility]  
11/12/2018 11:00 AM Ryan Vitale, PT MMCPTPB [facility]

## 2018-10-22 ENCOUNTER — HOSPITAL ENCOUNTER (OUTPATIENT)
Dept: PHYSICAL THERAPY | Age: 33
Discharge: HOME | End: 2018-10-22
Payer: COMMERCIAL

## 2018-10-22 PROCEDURE — 97110 THERAPEUTIC EXERCISES: CPT

## 2018-10-26 ENCOUNTER — APPOINTMENT (OUTPATIENT)
Dept: PHYSICAL THERAPY | Age: 33
End: 2018-10-26
Payer: COMMERCIAL

## 2018-10-29 ENCOUNTER — HOSPITAL ENCOUNTER (OUTPATIENT)
Dept: PHYSICAL THERAPY | Age: 33
End: 2018-10-29
Payer: COMMERCIAL

## 2018-11-01 ENCOUNTER — HOSPITAL ENCOUNTER (OUTPATIENT)
Dept: PHYSICAL THERAPY | Age: 33
Discharge: HOME | End: 2018-11-01
Payer: COMMERCIAL

## 2018-11-01 PROCEDURE — 97110 THERAPEUTIC EXERCISES: CPT

## 2018-11-01 NOTE — PROGRESS NOTES
PT DISCHARGE DAILY NOTE AND [id number] Date:2018 Patient name: Estrella Vale Start of Care: 10/5/18 Referral source: Kings Martinez* : 1985 Medical/Treatment Diagnosis: Right hip pain [M25.551] Pain in lower back [M54.5] Sacrococcygeal disorders, not elsewhere classified [M53.3] Onset Date:about 8-9 months Prior Hospitalization: see medical history Provider#: [id number] Medications: Verified on Patient Summary List   
Comorbidities: pregnant Prior Level of Function: living with family, 4 IZZY, full FOS to 2nd floor    
  
 
Visits from Start of Care: 6    Missed Visits: 1 Reporting Period : 10/5/18 to 18 Date:2018 : 1985 [x]  Patient  Verified Payor: Reed Jesus / Plan: [employer] / Product Type: HMO /   
In time:2:30  Out time:3:09 Total Treatment Time (min): 39 Visit #: 6 of 12 SUBJECTIVE Pain Level (0-10 scale): 0/10 Any medication changes, allergies to medications, adverse drug reactions, diagnosis change, or new procedure performed?: [x] No    [] Yes (see summary sheet for update) Subjective functional status/changes:   [] No changes reported Pt reports that she had some pain in her left hip/SI joint last night, but she does not have any pain right now. She would like to be DC today and do her exercises at home until her son is born. OBJECTIVE 39 min Therapeutic Exercise:  [x] See flow sheet :  
Rationale: increase ROM and increase strength to improve the patients ability to continue to progress independently upon DC With 
 [] TE 
 [] TA 
 [] neuro 
 [] other: Patient Education: [x] Review HEP [] Progressed/Changed HEP based on:  
[] positioning   [] body mechanics   [] transfers   [] heat/ice application   
[] other:   
 
Other Objective/Functional Measures:  
 
Gave and reviewed final HEP Gave pt green and blue TB for independent progression with clams Reviewed avoiding laying supine with HEP unless HOB was elevated No pain/difficulty with interventions Pain Level (0-10 scale) post treatment: 0/10 Summary of Care: 
Goal: Pt will be independent with HEP to maintain progression. Status at last note/certification: Goal met. Status at discharge: met Goal: Pt will improve FOTO score by 7 points to 76/100 to show improvement with functional mobility performance.     
Status at last note/certification: Progressing. Hip FOTO improved 4 points. Not met. Lumbar FOTO declined 4 points Status at discharge: not met Goal: Pt will report no more than 2-3/10 to improve QOL. Status at last note/certification: Progressing. Max pain 7/10, average pain 4/10, least pain 0/10 Status at discharge: not met Goal: Pt will be able to amb/jogging for at least 10 min with no increased of pain so she can navigate community and perform recreational activities with ease. Status at last note/certification: Goal met. Pt can walk for > 1 hour Status at discharge: met ASSESSMENT/Changes in Function:  
 
Pt has made slow, steady progress in therapy, meeting 2/4 initial goals and progressing with hip FOTO and pain level goals. Lumbar FOTO has declined, likely with growing fetus creating increased stress over l/s contributing. Pt is compliant with HEP and would like to continue independently with HEP at this time until childbirth. She will follow up with MD and return to therapy if needed post-partum. Thank you for this referral! 
   
PLAN [x]Discontinue therapy: [x]Patient has reached or is progressing toward set goals []Patient is non-compliant or has abdicated 
    []Due to lack of appreciable progress towards set goals Yasmani Rosenbaum, PT 11/1/2018  2:41 PM

## 2018-11-05 ENCOUNTER — APPOINTMENT (OUTPATIENT)
Dept: PHYSICAL THERAPY | Age: 33
End: 2018-11-05
Payer: COMMERCIAL

## 2018-11-12 ENCOUNTER — APPOINTMENT (OUTPATIENT)
Dept: PHYSICAL THERAPY | Age: 33
End: 2018-11-12
Payer: COMMERCIAL

## 2018-11-20 PROBLEM — Z3A.39 39 WEEKS GESTATION OF PREGNANCY: Status: ACTIVE | Noted: 2018-11-20

## 2018-11-20 PROBLEM — O36.5990 IUGR (INTRAUTERINE GROWTH RESTRICTION) AFFECTING CARE OF MOTHER: Status: ACTIVE | Noted: 2018-11-20

## 2018-11-22 PROBLEM — Z3A.39 39 WEEKS GESTATION OF PREGNANCY: Status: RESOLVED | Noted: 2018-11-20 | Resolved: 2018-11-22

## 2018-11-22 PROBLEM — O36.5990 IUGR (INTRAUTERINE GROWTH RESTRICTION) AFFECTING CARE OF MOTHER: Status: RESOLVED | Noted: 2018-11-20 | Resolved: 2018-11-22

## 2019-08-13 ENCOUNTER — HOSPITAL ENCOUNTER (OUTPATIENT)
Dept: PHYSICAL THERAPY | Age: 34
Discharge: HOME | End: 2019-08-13
Payer: COMMERCIAL

## 2019-08-13 PROCEDURE — 97161 PT EVAL LOW COMPLEX 20 MIN: CPT

## 2019-08-13 PROCEDURE — 97110 THERAPEUTIC EXERCISES: CPT

## 2019-08-13 NOTE — PROGRESS NOTES
[facility]  [facility]  [place]  [address] [place] [address]  [phone] fax    Plan of Care/ Statement of Necessity for Physical Therapy Services    Patient name: Maryam Fernández Start of Care: 2019   Referral source: Referred, Self, MD : 1985    Medical Diagnosis: Neck pain [M54.2]  Low back pain [M54.5]  Payor: Laury White / Plan: [employer] / Product Type: HMO /  Onset Date: chronic    Treatment Diagnosis: neck and back pain   Prior Hospitalization: see medical history Provider#: [id number]   Medications: Verified on Patient summary List    Comorbidities: PREGNANT 17 WEEKS GESTATION JUANI 2020   Prior Level of Function: decreased pain      The Plan of Care and following information is based on the information from the initial evaluation. Assessment / key information: Patient is a 30 y.o. female who presents to [facility] with a diagnosis of Neck pain [M54.2]  Low back pain [M54.5]. Patient is her own historian. Living situation is as follows: with  and 10 month old. Occupation:  but not working. Current Deficits include: pain, edema, decreased mobility, decreased strength, and decreased postural awareness with resulting limitations in ADL's and in functional abilities. Pt presents with c/o occipital headaches and left sided lower back pain which is pregnancy related and chronic in nature respectively. Pt had a round of therapy with last baby who was born 2018 which decreased pain. Pt said this is ongoing and had the pain prior to her first pregnancy. Pt is currently pregnant and is 17 weeks or 18 weeks gestation. Pain has increased, but is localized to the left SI sacral sulcus. No radicular s/s in UEs/LEs. No SOB, LOB/buckling/falls. No tinnitus, diploplia, vertigo. Pt c/o feeling \"uneven\" while ambulating.  Pt regularly sees obstetrician and notes an uncomplicated pregnancy thus far. She c/o occasional nausea and owns an SI belt, but has not worn it. BP at start of session 110/72mmHg   Impairments are as follows:   Pain 0; TTP left sacral sulcus  Posture increased LS lordosis   Gait WFLs  AROM/PROM   Active Movements: ALL CS, TS, AND LS MOVEMENTS WNLS AND PAIN FREE  ROM  AROM Comments:pain, area   Forward flexion:      Extension     SB right      SB left      Rotation right     Rotation left       Strength   4-/5 hip extension BLEs  Special Tests   Negative Spurlings, compression and distraction CS  Negative L-S testing/stork position Left LS  + AI on left  Functional Tests   WFLs  Functional Deficits include: pain in the left SI joint and Has causing difficulty ambulating. Patient's FOTO score was a 66/100 indicating decreased function. Patient will benefit from a POC addressing such impairments and limitations in order to improve quality of life and return to PLOF. Evaluation Complexity History MEDIUM  Complexity : 1-2 comorbidities / personal factors will impact the outcome/ POC ; Examination MEDIUM Complexity : 3 Standardized tests and measures addressing body structure, function, activity limitation and / or participation in recreation  ;Presentation LOW Complexity : Stable, uncomplicated  ;Clinical Decision Making MEDIUM Complexity : FOTO score of 26-74  Overall Complexity Rating: LOW   Problem List: pain affecting function and decrease strength     Treatment Plan may include any combination of the following: Therapeutic exercise, Therapeutic activities, Neuromuscular re-education, Physical agent/modality, Manual therapy and Patient education    Patient / Family readiness to learn indicated by: asking questions  Persons(s) to be included in education: patient (P)  Barriers to Learning/Limitations: None  Patient Goal (s): get each area stronger  Patient Self Reported Health Status: excellent  Rehabilitation Potential: excellent     Short Term Goals:  To be accomplished in  2 treatments:  1. Pt will be compliant with HEP for symptom management at home.  Long Term Goals: To be accomplished in  12  treatments:  1. Pt will demonstrate an increased FOTO score to 75/100 in order to improve function. 2. Pt will be independent with HEP at D/C for self management. 3. Pt will demonstrate a negative supine-sit test on 3 consecutive occasions in order to increase pelvic alignment and return to a walking program  4. Pt will report decreased frequency of headaches to < 3 x week in order to improve concentration     Frequency / Duration: Patient to be seen 2 times per week for 12 treatments. Patient/ Caregiver education and instruction: Diagnosis, prognosis, exercises; pt instructed to ask MD about taking prenatal with DHA and B6 for nausea    [x]  Plan of care has been reviewed with PTA    The severity rating is based on clinical judgment and the FOTO score. Certification Period:   Hasmukh Hahn, PT 8/13/2019 12:12 PM    ________________________________________________________________________    I certify that the above Therapy Services are being furnished while the patient is under my care. I agree with the treatment plan and certify that this therapy is necessary.     Physician's Signature:____________Date:_________TIME:________    ** Signature, Date and Time must be completed for valid certification **    Please sign and return to [facility]  [facility] [employer] [facility]  [address] [place] [address]  [phone] fax

## 2019-08-13 NOTE — PROGRESS NOTES
PT LUMBAR EVAL AND TREATMENT     Patient Name: Kylie Kevin  Date:2019  : 1985  [x]  Patient  Verified  Payor: Rakesh Ayoub / Plan: [employer] HMO / Product Type: HMO /    In time:1115  Out time:1200  Total Treatment Time (min): 45  Total Timed Codes (min): 25  1:1 Treatment Time ( only): 45   Visit #: 1 of 12    Treatment Area: Neck pain [M54.2]  Low back pain [M54.5]    SUBJECTIVE  Pain Level (0-10 on visual analog scale): 0  Any medication changes, allergies to medications, diagnosis change, or new procedure performed: see summary sheet for update  Assessment / key information: Patient is a 30 y.o. female who presents to [facility] with a diagnosis of Neck pain [M54.2]  Low back pain [M54.5]. Patient is her own historian. Living situation is as follows: with  and 10 month old. Occupation:  but not working. Current Deficits include: pain, edema, decreased mobility, decreased strength, and decreased postural awareness with resulting limitations in ADL's and in functional abilities. Pt presents with c/o occipital headaches and left sided lower back pain which is pregnancy related and chronic in nature respectively. Pt had a round of therapy with last baby who was born 2018 which decreased pain. Pt said this is ongoing and had the pain prior to her first pregnancy. Pt is currently pregnant and is 17 weeks or 18 weeks gestation. Pain has increased, but is localized to the left SI sacral sulcus. No radicular s/s in UEs/LEs. No SOB, LOB/buckling/falls. No tinnitus, diploplia, vertigo. Pt c/o feeling \"uneven\" while ambulating. Pt regularly sees obstetrician and notes an uncomplicated pregnancy thus far. She c/o occasional nausea and owns an SI belt, but has not worn it.     BP at start of session 110/72mmHg   Impairments are as follows:   Pain 0; TTP left sacral sulcus  Posture increased LS lordosis   Gait WFLs  AROM/PROM   Active Movements: ALL CS, TS, AND LS MOVEMENTS WNLS AND PAIN FREE  ROM  AROM Comments:pain, area   Forward flexion:      Extension     SB right      SB left      Rotation right     Rotation left       Strength   4-/5 hip extension BLEs  Special Tests   Negative Spurlings, compression and distraction CS  Negative L-S testing/stork position Left LS  + AI on left  Functional Tests   WFLs    Manual  min:   Rationale: provided to improve the patient's ability to     Modality (rationale):   []  E-Stim: type _ x _ min     []att   []unatt   []w/US   []w/ice   []w/heat  []  Traction: []cerv   []pelvic   _ lbs x _ min     []pro   []sup   []int   []const  []  Ultrasound: []cont   []pulse    _ W/cm2 x _  min   []1MHz   []3MHz  []  Iontophoresis: []take home patch w/ dexamethazone    []40mA   []80mA                               []_ mA min w/: []dexamethazone   []other:_  []  Ice pack _  min     [] Hot pack _  min     [] Paraffin _  min  []  Other:   Rationale: provided to improve the patient's ability to     Patient Education: [x]Established HEP    [x] POT  (minutes) :25min; sleeping postures using pillows, instruction to ask MD about prenatal with DHA and B6 for nausea, proper hydration/walking program; use of SI belt; self METs and HEP  Rationale: provided to improve the patient's ability to decrease pain with ambulation    Pain Level (0-10 scale) post treatment: 0    ASSESSMENT  [x]  See Plan of Care    PLAN  [x]  Upgrade activities as tolerated     [x] Other:_    See POC for Frequency/duration of visits     Justification for Eval Code Complexity:   Patient History : see POC  Examination: (see exam)  Clinical Presentation: stable  Clinical Decision Making : FOTO : 70 /100     G-Codes (GP):     Kj Ken, PT 8/13/2019  12:29 PM

## 2019-08-15 ENCOUNTER — HOSPITAL ENCOUNTER (OUTPATIENT)
Dept: PHYSICAL THERAPY | Age: 34
Discharge: HOME | End: 2019-08-15
Payer: COMMERCIAL

## 2019-08-15 PROCEDURE — 97110 THERAPEUTIC EXERCISES: CPT

## 2019-08-15 NOTE — PROGRESS NOTES
PT DAILY TREATMENT NOTE 10-18    Patient Name: Frieda Rossi  Date:8/15/2019  : 1985  [x]  Patient  Verified  Payor: Giacomo Fitzgerald / Plan: [employer] / Product Type: HMO /    In time: 9:32  Out time: 10:06  Total Treatment Time (min): 34  Visit #: 2 of 12    Treatment Area: Neck pain [M54.2]  Low back pain [M54.5]    SUBJECTIVE  Pain Level (0-10 scale): 0/10  Any medication changes, allergies to medications, adverse drug reactions, diagnosis change, or new procedure performed?: [x] No    [] Yes (see summary sheet for update)  Subjective functional status/changes:   [] No changes reported  Pt. Reports she is doing pretty good today. She reports having a headache yesterday but it is doing better today. OBJECTIVE    34 min Therapeutic Exercise:  [x] See flow sheet :   Rationale: increase ROM and increase strength to improve the patients ability to increase ease of ADLs           With   [x] TE   [] TA   [] neuro   [] other: Patient Education: [x] Review HEP    [] Progressed/Changed HEP based on:   [] positioning   [] body mechanics   [] transfers   [] heat/ice application    [] other:      Other Objective/Functional Measures:   Pt. Was challenged with body blade exercises on swiss ball  She required cues to perform bird dog exercise correctly  She tolerated PT well with no reports of increased pain  Pt. Was educated on updated HEP with swiss ball     Pain Level (0-10 scale) post treatment: 0/10    ASSESSMENT/Changes in Function:  Pt. Initiated PT and is compliant with her HEP. Patient will continue to benefit from skilled PT services to modify and progress therapeutic interventions, address functional mobility deficits, address ROM deficits, address strength deficits, analyze and address soft tissue restrictions, analyze and cue movement patterns, analyze and modify body mechanics/ergonomics and assess and modify postural abnormalities to attain remaining goals.      Progress towards goals / Updated goals: · Short Term Goals: To be accomplished in  2  treatments:  1. Pt will be compliant with HEP for symptom management at home. Met (8/15/19)     · Long Term Goals: To be accomplished in  12  treatments:  1. Pt will demonstrate an increased FOTO score to 75/100 in order to improve function. 2. Pt will be independent with HEP at D/C for self management. 3. Pt will demonstrate a negative supine-sit test on 3 consecutive occasions in order to increase pelvic alignment and return to a walking program  4.  Pt will report decreased frequency of headaches to < 3 x week in order to improve concentration *    PLAN  []  Upgrade activities as tolerated     [x]  Continue plan of care  []  Update interventions per flow sheet       []  Discharge due to:_  []  Other:_      [place] Cade PT 8/15/2019  8:37 AM    Future Appointments   Date Time Provider Joann Molina   8/15/2019  9:30 AM Alexandra Hanna, PT [facility]   8/20/2019  2:00 PM Aurora Rangel, PT [facility]   8/22/2019  2:00 PM Aurora Rangel, PT [facility]   8/27/2019  2:00 PM Aurora Rangel, PT [facility]   8/29/2019  2:00 PM Alexandra Hanna, PT [facility]   9/4/2019 11:00 AM Alexandra Hanna, PT [facility]   9/6/2019  4:00 PM [facility] Hanna, [facility]   9/9/2019  2:00 PM Alexandra Hanna, PT [id number]   9/12/2019  2:00 PM Alexandra Hanna, PT [facility]

## 2019-08-20 ENCOUNTER — APPOINTMENT (OUTPATIENT)
Dept: PHYSICAL THERAPY | Age: 34
End: 2019-08-20
Payer: COMMERCIAL

## 2019-08-21 ENCOUNTER — HOSPITAL ENCOUNTER (OUTPATIENT)
Dept: PHYSICAL THERAPY | Age: 34
Discharge: HOME | End: 2019-08-21
Payer: COMMERCIAL

## 2019-08-21 PROCEDURE — 97110 THERAPEUTIC EXERCISES: CPT

## 2019-08-21 NOTE — PROGRESS NOTES
PT DAILY TREATMENT NOTE 10-18    Patient Name: Re Flanagan  Date:2019  : 1985  [x]  Patient  Verified  Payor: Shaniqua Rosa / Plan: [employer] H[employer] / Product Type: HMO /    In time: 8:00  Out time: 8:40  Total Treatment Time (min): 40  Visit #: 3 of 12    Treatment Area: Neck pain [M54.2]  Low back pain [M54.5]    SUBJECTIVE  Pain Level (0-10 scale):  0/10  Any medication changes, allergies to medications, adverse drug reactions, diagnosis change, or new procedure performed?: [x] No    [] Yes (see summary sheet for update)  Subjective functional status/changes:   [] No changes reported  Pt. Reports she is feeling pretty good today but had a lot of pain yesterday. She reports she has been doing self MET at home. She reports having less headaches as well. OBJECTIVE    40 min Therapeutic Exercise:  [x] See flow sheet :   Rationale: increase ROM and increase strength to improve the patients ability to increase ease of ADLs          With   [x] TE   [] TA   [] neuro   [] other: Patient Education: [x] Review HEP    [] Progressed/Changed HEP based on:   [] positioning   [] body mechanics   [] transfers   [] heat/ice application    [] other:      Other Objective/Functional Measures:   Good pelvic alignment today  Negative ASLR test   Pt. Was challenged with body blade activities  She tolerated PT well with no reports of increased pain  She has more difficulty with modified tree exercise standing on left LE      Pain Level (0-10 scale) post treatment:  0/10    ASSESSMENT/Changes in Function: pt. Is progressing slowly towards goals. She continues to have fluctuate pain levels but does have less headaches overall. She also continues to demonstrates decreased pelvic stability and decreased hip strength. She is compliant with her HEP.      Patient will continue to benefit from skilled PT services to modify and progress therapeutic interventions, address functional mobility deficits, address ROM deficits, address strength deficits, analyze and address soft tissue restrictions, analyze and cue movement patterns and analyze and modify body mechanics/ergonomics to attain remaining goals. Progress towards goals / Updated goals: · Short Term Goals: To be accomplished in  2  treatments:  1. Pt will be compliant with HEP for symptom management at home. Met (8/15/19)     · Long Term Goals: To be accomplished in  12  treatments:  1. Pt will demonstrate an increased FOTO score to 75/100 in order to improve function. 2. Pt will be independent with HEP at D/C for self management. 3. Pt will demonstrate a negative supine-sit test on 3 consecutive occasions in order to increase pelvic alignment and return to a walking program  4. Pt will report decreased frequency of headaches to < 3 x week in order to improve concentration *  Progressing: pt.  Is having less episodes of dizziness (8/21/19)    PLAN  []  Upgrade activities as tolerated     [x]  Continue plan of care  []  Update interventions per flow sheet       []  Discharge due to:_  []  Other:_      Shahnaz Chen PT 8/21/2019  8:01 AM    Future Appointments   Date Time Provider Joann Molina   8/22/2019  8:00 AM Hiram Owusu PT [facility] [address]   8/26/2019 10:00 AM Hiram Owusu PT [facility] [address]   8/29/2019  2:00 PM Oj Harrison PT [facility] [address]   9/4/2019 11:00 AM Oj Harrison PT [facility] [address]   9/5/2019 10:00 AM Oj Harrison PT [facility] [address]   9/9/2019 11:30 AM Oj aHrrison PT [facility] [address]   9/12/2019 10:00 AM Oj Harrison PT [facility] [address]

## 2019-08-22 ENCOUNTER — HOSPITAL ENCOUNTER (OUTPATIENT)
Dept: PHYSICAL THERAPY | Age: 34
Discharge: HOME | End: 2019-08-22
Payer: COMMERCIAL

## 2019-08-22 PROCEDURE — 97110 THERAPEUTIC EXERCISES: CPT

## 2019-08-22 NOTE — PROGRESS NOTES
PT DAILY TREATMENT NOTE 10-18    Patient Name: Eugenie Larsen  Date:2019  : 1985  [x]  Patient  Verified  Payor: Reji Vazquez / Plan: [employer] / Product Type: HMO /    In time:8:00  Out time:8:30  Total Treatment Time (min): 30  Visit #: 4 of 12    Treatment Area: Neck pain [M54.2]  Low back pain [M54.5]    SUBJECTIVE  Pain Level (0-10 scale): 0/10  Any medication changes, allergies to medications, adverse drug reactions, diagnosis change, or new procedure performed?: [x] No    [] Yes (see summary sheet for update)  Subjective functional status/changes:   [] No changes reported  Pt reports 80% overall improvement since start of care. Some days she doesn't have any pain and some days she feels it. Carrying her son on her hip can flare up her pain. It also hurts her upper back if she's holding her son in front of her and he's jumping. She has not had a headache for 4 days, which she's very pleased about. The headaches were her main concern. She changed her pillow and she believes that helped. OBJECTIVE      30 min Therapeutic Exercise:  [x] See flow sheet :   Rationale: increase ROM and increase strength to improve the patients ability to improve ease of caring for her son. With   [] TE   [] TA   [] neuro   [] other: Patient Education: [x] Review HEP    [] Progressed/Changed HEP based on:   [] positioning   [] body mechanics   [] transfers   [] heat/ice application    [x] other: advised pt to follow up with her MD in the near future d/t 30 day limit for physical therapy with direct access before requiring MD signature, advised pt to switch hips when she's carrying her son.        Other Objective/Functional Measures:     1/2 foam roll placed across l/s to reduce trunk rotation compensation with quadruped interventions   Pt reported increased difficulty with fire hydrant on left compared to right    No pain during or following session     Pain Level (0-10 scale) post treatment: 0/10    ASSESSMENT/Changes in Function:     Pt is making slow, steady progress in therapy. She reports 80% improvement in sx but continues to report fluctuating pain levels depending on the day and the activity she's performing with her son, likely with continued decreased pelvic stability contributing. Headaches are decreasing, and pt reports she has not had a headache for 4 days. Will continue to address posture and core/hip stability deficits in order to reduce pain and improve ease of caring for her son. Patient will continue to benefit from skilled PT services to modify and progress therapeutic interventions, address ROM deficits, address strength deficits, analyze and address soft tissue restrictions, analyze and cue movement patterns, analyze and modify body mechanics/ergonomics, assess and modify postural abnormalities and instruct in home and community integration to attain remaining goals. Progress towards goals / Updated goals    Short Term Goals: To be accomplished in  2  treatments:  1. Pt will be compliant with HEP for symptom management at home. Met   Long Term Goals: To be accomplished in  12  treatments:  1. Pt will demonstrate an increased FOTO score to 75/100 in order to improve function. 2. Pt will be independent with HEP at D/C for self management. 3. Pt will demonstrate a negative supine-sit test on 3 consecutive occasions in order to increase pelvic alignment and return to a walking program  4. Pt will report decreased frequency of headaches to < 3 x week in order to improve concentration Progressing.   Pt has not had a headache for 4 days 8/22/19     PLAN  [x]  Upgrade activities as tolerated     [x]  Continue plan of care  []  Update interventions per flow sheet       []  Discharge due to:_  []  Other:_      Supa Nieto PT 8/22/2019  8:11 AM    Future Appointments   Date Time Provider Joann Molina   8/26/2019 10:00 AM Ravi MIGUEL, PT [facility]   8/29/2019  2:00 PM Aissatou Barrera, PT [facility]   9/4/2019 11:00 AM Aissatou Barrera, PT [facility]   9/5/2019 10:00 AM Aissatou Barrera, PT [facility]   9/12/2019 10:00 AM Aissatou Barrera, PT [facility]   9/13/2019  4:00 PM Aissatou Barrera, PT [facility]

## 2019-08-26 ENCOUNTER — HOSPITAL ENCOUNTER (OUTPATIENT)
Dept: PHYSICAL THERAPY | Age: 34
Discharge: HOME | End: 2019-08-26
Payer: COMMERCIAL

## 2019-08-26 PROCEDURE — 97110 THERAPEUTIC EXERCISES: CPT

## 2019-08-26 NOTE — PROGRESS NOTES
PT DAILY TREATMENT NOTE 10-18    Patient Name: Laura Escobar  Date:2019  : 1985  [x]  Patient  Verified  Payor: [employer] / Plan: [employer] HMO / Product Type: HMO /    In time:9:56  Out time:10:35  Total Treatment Time (min): 39  Visit #: 5 of 12    Treatment Area: Neck pain [M54.2]  Low back pain [M54.5]    SUBJECTIVE  Pain Level (0-10 scale): 410   Any medication changes, allergies to medications, adverse drug reactions, diagnosis change, or new procedure performed?: [x] No    [] Yes (see summary sheet for update)  Subjective functional status/changes:   [] No changes reported  Pt reports that she has the most pain when she's sitting or laying down and she moves her leg. She feels like her upper back is getting better but the pain in her left lumbar region is about the same. The headaches are much better, and she has not had a headache for > 1 week. She is doing the self-MET at home when she has left lumbar pain at home and that's helping. Pt reports the clams are still challenging without resistance when she focuses on having her hips in the correct position.       OBJECTIVE      39 min Therapeutic Exercise:  [x] See flow sheet :   Rationale: increase ROM and increase strength to improve the patients ability to improve ease of position changes and caring for her child      With   [] TE   [] TA   [] neuro   [] other: Patient Education: [x] Review HEP    [] Progressed/Changed HEP based on:   [] positioning   [] body mechanics   [] transfers   [] heat/ice application    [x] other: reviewed anatomy of spine/SI joint using spine model      Other Objective/Functional Measures:     Left AI correct with self-MET with manual shotgun  Reduced pain in left lumbar region with moving left leg in supine following correction of pelvic obliquity     Required cues to perform interventions more slowly, improved with cuing  Challenged with hip TB IR/ER while seated on SB   Performed clams without TB this visit    No pain following session      Pain Level (0-10 scale) post treatment: 0/10    ASSESSMENT/Changes in Function:     Pt is making slow, steady progress towards initial goals in therapy. 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 Will continue to address core/hip stability and posterior kinetic chain strength in order to improve ease of position changes and caring for her son. Patient will continue to benefit from skilled PT services to modify and progress therapeutic interventions, address functional mobility deficits, address ROM deficits, address strength deficits, analyze and address soft tissue restrictions, analyze and cue movement patterns, assess and modify postural abnormalities and instruct in home and community integration to attain remaining goals. Progress towards goals / Updated goals:  Short Term Goals: To be accomplished in  2  treatments:  1. Pt will be compliant with HEP for symptom management at home. Met   Long Term Goals: To be accomplished in  12  treatments:  1. Pt will demonstrate an increased FOTO score to 75/100 in order to improve function. 2. Pt will be independent with HEP at D/C for self management. 3. Pt will demonstrate a negative supine-sit test on 3 consecutive occasions in order to increase pelvic alignment and return to a walking program  4. Pt will report decreased frequency of headaches to < 3 x week in order to improve concentration Progressing.   Pt has not had a headache for > 1 week 8/26/19        PLAN  [x]  Upgrade activities as tolerated     [x]  Continue plan of care  []  Update interventions per flow sheet       []  Discharge due to:_  []  Other:_      Reji Pickens, PT 8/26/2019  10:00 AM    Future Appointments   Date Time Provider Joann Molina   8/29/2019  2:00 PM Thelma Norton, [place] [facility]   9/4/2019 11:00 AM Thelma Norton, PT [facility]   9/5/2019 10:00 AM Thelma Norton, PT [facility]   9/12/2019 10:00 AM Thelma Norton, PT [facility]   9/13/2019  4:00 PM Thelma Norton, PT [facility]

## 2019-08-27 ENCOUNTER — APPOINTMENT (OUTPATIENT)
Dept: PHYSICAL THERAPY | Age: 34
End: 2019-08-27
Payer: COMMERCIAL

## 2019-08-29 ENCOUNTER — HOSPITAL ENCOUNTER (OUTPATIENT)
Dept: PHYSICAL THERAPY | Age: 34
Discharge: HOME | End: 2019-08-29
Payer: COMMERCIAL

## 2019-08-29 PROCEDURE — 97110 THERAPEUTIC EXERCISES: CPT

## 2019-08-29 NOTE — PROGRESS NOTES
PT DAILY TREATMENT NOTE 10-18    Patient Name: Frieda Rossi  Date:2019  : 1985  [x]  Patient  Verified  Payor: Giacomo Fitzgerald / Plan: [employer] HMO / Product Type: HMO /    In time: 2:01  Out time: 2:35  Total Treatment Time (min): 34  Visit #: 6 of 12    Treatment Area: Neck pain [M54.2]  Low back pain [M54.5]    SUBJECTIVE  Pain Level (0-10 scale):  0/10  Any medication changes, allergies to medications, adverse drug reactions, diagnosis change, or new procedure performed?: [x] No    [] Yes (see summary sheet for update)  Subjective functional status/changes:   [] No changes reported  Pt. Reports she is feeling pretty good today. She reports some back pain the other day but doing pretty good overall. OBJECTIVE    34 min Therapeutic Exercise:  [x] See flow sheet :   Rationale: increase ROM and increase strength to improve the patients ability to increase ease of ADLs           With   [x] TE   [] TA   [] neuro   [] other: Patient Education: [x] Review HEP    [] Progressed/Changed HEP based on:   [] positioning   [] body mechanics   [] transfers   [] heat/ice application    [] other:      Other Objective/Functional Measures:   Good pelvic alignment today  She is challenged with 30 reps of clamshells  Required cues to perform cat/camel exercise correctly     Pain Level (0-10 scale) post treatment: 0/10    ASSESSMENT/Changes in Function: pt. Is progressing well with physical therapy. She is having less pain overall and demonstrates improving core stability. Discussed D/C plans with patient and plan is to D/C after 10 visits.      Patient will continue to benefit from skilled PT services to modify and progress therapeutic interventions, address functional mobility deficits, address ROM deficits, address strength deficits, analyze and address soft tissue restrictions, analyze and cue movement patterns, analyze and modify body mechanics/ergonomics and assess and modify postural abnormalities to attain remaining goals. Progress towards goals / Updated goals:  Short Term Goals: To be accomplished in  2  treatments:  1. Pt will be compliant with HEP for symptom management at home. Met     Long Term Goals: To be accomplished in  12  treatments:  1. Pt will demonstrate an increased FOTO score to 75/100 in order to improve function. 2. Pt will be independent with HEP at D/C for self management.   3. Pt will demonstrate a negative supine-sit test on 3 consecutive occasions in order to increase pelvic alignment and return to a walking program  4. Pt will report decreased frequency of headaches to < 3 x week in order to improve concentration Progressing.  Pt has not had a headache for > 1 week 8/26/19     PLAN  []  Upgrade activities as tolerated     [x]  Continue plan of care  []  Update interventions per flow sheet       []  Discharge due to:_  []  Other:_      Vero Cuevas, PT 8/29/2019  2:07 PM    Future Appointments   Date Time Provider Joann Molina   9/4/2019 11:00 AM Raven Hansen, PT [facility]   9/5/2019 10:00 AM Severo Nyhan, PT [facility]   9/12/2019 10:00 AM Raven Hansen, PT [facility]   9/13/2019  4:00 PM Raven Hansen, PT [facility]

## 2019-09-04 ENCOUNTER — APPOINTMENT (OUTPATIENT)
Dept: PHYSICAL THERAPY | Age: 34
End: 2019-09-04

## 2019-09-05 ENCOUNTER — APPOINTMENT (OUTPATIENT)
Dept: PHYSICAL THERAPY | Age: 34
End: 2019-09-05

## 2019-09-06 ENCOUNTER — APPOINTMENT (OUTPATIENT)
Dept: PHYSICAL THERAPY | Age: 34
End: 2019-09-06

## 2019-09-09 ENCOUNTER — APPOINTMENT (OUTPATIENT)
Dept: PHYSICAL THERAPY | Age: 34
End: 2019-09-09

## 2019-09-12 ENCOUNTER — APPOINTMENT (OUTPATIENT)
Dept: PHYSICAL THERAPY | Age: 34
End: 2019-09-12

## 2019-10-23 NOTE — PROGRESS NOTES
[facility] [address] [address]  [address]  [phone] fax    Physical Therapy Discharge Summary    Patient name: Augustine Lawrence Start of Care: 2019   Referral source: Referred, MD Nic : 1985               Medical Diagnosis: Neck pain [M54.2]  Low back pain [M54.5]  Payor: Anaya Hunt / Plan: [employer] HMO / Product Type: HMO /  Onset Date: chronic               Treatment Diagnosis: neck and back pain   Prior Hospitalization: see medical history Provider#: [id number]   Medications: Verified on Patient summary List    Comorbidities: PREGNANT 17 WEEKS GESTATION JUANI 2020   Prior Level of Function: decreased pain    Visits from Start of Care: 6    Missed Visits: 0    Reporting Period : 19 to 19    Summary of Care:  Goal: Pt will demonstrate an increased FOTO score to 75/100 in order to improve function. Status at last note/certification: 66  Status at discharge: not met    Goal: Pt will be independent with HEP at D/C for self management. Status at last note/certification: n/a  Status at discharge: not met    Goal:  Pt will demonstrate a negative supine-sit test on 3 consecutive occasions in order to increase pelvic alignment and return to a walking program  Status at last note/certification: positive  Status at discharge: not met    Goal: Pt will report decreased frequency of headaches to < 3 x week in order to improve concentration   Status at last note/certification: n/a  Status at discharge: not met    Pt. Was progressing well with physical therapy. She reports having less pian and decreased headaches. She also demonstrated improving core stability. She self D/C secondary to her improvement and independence with HEP. Goals were unable to be re-assessed secondary to unplanned D/C.        ASSESSMENT/RECOMMENDATIONS:  [x]Discontinue therapy: [x]Patient has reached or is progressing toward set goals      []Patient is non-compliant or has abdicated      []Due to lack of appreciable progress towards set goals    Karthik Quevedo, PT 10/23/2019 2:59 PM

## 2020-01-11 PROBLEM — R35.0 FREQUENCY OF URINATION: Status: RESOLVED | Noted: 2017-07-12 | Resolved: 2020-01-11

## 2020-01-11 PROBLEM — R30.0 DYSURIA: Status: RESOLVED | Noted: 2017-07-12 | Resolved: 2020-01-11

## 2020-01-11 PROBLEM — R39.9 LOWER URINARY TRACT SYMPTOMS (LUTS): Status: RESOLVED | Noted: 2017-07-12 | Resolved: 2020-01-11

## 2020-01-11 PROBLEM — Z71.89 ACP (ADVANCE CARE PLANNING): Status: RESOLVED | Noted: 2017-03-06 | Resolved: 2020-01-11

## 2020-01-11 PROBLEM — Z3A.39 39 WEEKS GESTATION OF PREGNANCY: Status: ACTIVE | Noted: 2020-01-11

## 2022-05-25 PROBLEM — J98.01 ACUTE BRONCHOSPASM: Status: ACTIVE | Noted: 2022-05-25

## 2022-05-25 PROBLEM — Z91.09 MULTIPLE ENVIRONMENTAL ALLERGIES: Status: ACTIVE | Noted: 2022-05-25
